# Patient Record
Sex: FEMALE | Race: WHITE | NOT HISPANIC OR LATINO | Employment: FULL TIME | URBAN - METROPOLITAN AREA
[De-identification: names, ages, dates, MRNs, and addresses within clinical notes are randomized per-mention and may not be internally consistent; named-entity substitution may affect disease eponyms.]

---

## 2021-08-24 ENCOUNTER — HOSPITAL ENCOUNTER (EMERGENCY)
Facility: HOSPITAL | Age: 46
Discharge: HOME | End: 2021-08-24
Attending: EMERGENCY MEDICINE
Payer: COMMERCIAL

## 2021-08-24 VITALS
TEMPERATURE: 98 F | OXYGEN SATURATION: 99 % | RESPIRATION RATE: 18 BRPM | HEART RATE: 72 BPM | SYSTOLIC BLOOD PRESSURE: 136 MMHG | HEIGHT: 63 IN | WEIGHT: 170 LBS | DIASTOLIC BLOOD PRESSURE: 89 MMHG | BODY MASS INDEX: 30.12 KG/M2

## 2021-08-24 DIAGNOSIS — R52 PAIN: ICD-10-CM

## 2021-08-24 DIAGNOSIS — S63.502A SPRAIN OF LEFT WRIST, INITIAL ENCOUNTER: Primary | ICD-10-CM

## 2021-08-24 PROCEDURE — 25000003 PHARM REV CODE 250: Performed by: EMERGENCY MEDICINE

## 2021-08-24 PROCEDURE — 99284 EMERGENCY DEPT VISIT MOD MDM: CPT

## 2021-08-24 RX ORDER — HYDROCODONE BITARTRATE AND ACETAMINOPHEN 10; 325 MG/1; MG/1
1 TABLET ORAL
Status: COMPLETED | OUTPATIENT
Start: 2021-08-24 | End: 2021-08-24

## 2021-08-24 RX ADMIN — HYDROCODONE BITARTRATE AND ACETAMINOPHEN 1 TABLET: 10; 325 TABLET ORAL at 09:08

## 2021-10-06 ENCOUNTER — HOSPITAL ENCOUNTER (OUTPATIENT)
Facility: HOSPITAL | Age: 46
Discharge: HOME | End: 2021-10-07
Attending: EMERGENCY MEDICINE | Admitting: FAMILY MEDICINE
Payer: COMMERCIAL

## 2021-10-06 ENCOUNTER — CLINICAL SUPPORT (OUTPATIENT)
Dept: CARDIOLOGY | Facility: HOSPITAL | Age: 46
End: 2021-10-06
Payer: COMMERCIAL

## 2021-10-06 VITALS — HEIGHT: 63 IN | WEIGHT: 189 LBS | BODY MASS INDEX: 33.49 KG/M2

## 2021-10-06 DIAGNOSIS — R53.1 LEFT-SIDED WEAKNESS: Primary | ICD-10-CM

## 2021-10-06 DIAGNOSIS — I63.9 STROKE: ICD-10-CM

## 2021-10-06 DIAGNOSIS — I63.9 CVA (CEREBRAL VASCULAR ACCIDENT): ICD-10-CM

## 2021-10-06 PROBLEM — E66.811 CLASS 1 OBESITY IN ADULT: Status: ACTIVE | Noted: 2021-10-06

## 2021-10-06 PROBLEM — R51.9 HEADACHE: Status: ACTIVE | Noted: 2021-10-06

## 2021-10-06 PROBLEM — I63.511 ACUTE ISCHEMIC RIGHT MCA STROKE: Status: ACTIVE | Noted: 2021-10-06

## 2021-10-06 PROBLEM — E66.9 CLASS 1 OBESITY IN ADULT: Status: ACTIVE | Noted: 2021-10-06

## 2021-10-06 LAB
ALBUMIN SERPL BCP-MCNC: 5.1 G/DL (ref 3.5–5.2)
ALP SERPL-CCNC: 70 U/L (ref 55–135)
ALT SERPL W/O P-5'-P-CCNC: 32 U/L (ref 10–44)
ANION GAP SERPL CALC-SCNC: 11 MMOL/L (ref 8–16)
APTT PPP: 28.2 SEC (ref 25.6–35.8)
AST SERPL-CCNC: 25 U/L (ref 10–40)
B-HCG UR QL: NEGATIVE
BACTERIA #/AREA URNS HPF: ABNORMAL /HPF
BASOPHILS # BLD AUTO: 0.05 K/UL (ref 0–0.2)
BASOPHILS NFR BLD: 0.8 % (ref 0–1.9)
BILIRUB SERPL-MCNC: 1.3 MG/DL (ref 0.1–1)
BILIRUB UR QL STRIP: NEGATIVE
BNP SERPL-MCNC: 82 PG/ML (ref 0–99)
BUN SERPL-MCNC: 13 MG/DL (ref 6–20)
CALCIUM SERPL-MCNC: 9.7 MG/DL (ref 8.7–10.5)
CHLORIDE SERPL-SCNC: 100 MMOL/L (ref 95–110)
CHOLEST SERPL-MCNC: 157 MG/DL (ref 120–199)
CHOLEST/HDLC SERPL: 3 {RATIO} (ref 2–5)
CLARITY UR: CLEAR
CO2 SERPL-SCNC: 25 MMOL/L (ref 23–29)
COLOR UR: YELLOW
CREAT SERPL-MCNC: 0.8 MG/DL (ref 0.5–1.4)
CREAT SERPL-MCNC: 0.8 MG/DL (ref 0.5–1.4)
CTP QC/QA: YES
DIFFERENTIAL METHOD: ABNORMAL
EOSINOPHIL # BLD AUTO: 0.1 K/UL (ref 0–0.5)
EOSINOPHIL NFR BLD: 1.9 % (ref 0–8)
ERYTHROCYTE [DISTWIDTH] IN BLOOD BY AUTOMATED COUNT: 12.8 % (ref 11.5–14.5)
EST. GFR  (AFRICAN AMERICAN): >60 ML/MIN/1.73 M^2
EST. GFR  (NON AFRICAN AMERICAN): >60 ML/MIN/1.73 M^2
ESTIMATED AVG GLUCOSE: 97 MG/DL (ref 68–131)
GLUCOSE SERPL-MCNC: 90 MG/DL (ref 70–110)
GLUCOSE SERPL-MCNC: 91 MG/DL (ref 70–110)
GLUCOSE UR QL STRIP: NEGATIVE
HBA1C MFR BLD: 5 % (ref 4.5–6.2)
HCT VFR BLD AUTO: 46.1 % (ref 37–48.5)
HDLC SERPL-MCNC: 53 MG/DL (ref 40–75)
HDLC SERPL: 33.8 % (ref 20–50)
HGB BLD-MCNC: 16.4 G/DL (ref 12–16)
HGB UR QL STRIP: NEGATIVE
HYALINE CASTS #/AREA URNS LPF: 8 /LPF
IMM GRANULOCYTES # BLD AUTO: 0.01 K/UL (ref 0–0.04)
IMM GRANULOCYTES NFR BLD AUTO: 0.2 % (ref 0–0.5)
INR PPP: 1.1
KETONES UR QL STRIP: NEGATIVE
LDLC SERPL CALC-MCNC: 94 MG/DL (ref 63–159)
LEUKOCYTE ESTERASE UR QL STRIP: ABNORMAL
LYMPHOCYTES # BLD AUTO: 2.2 K/UL (ref 1–4.8)
LYMPHOCYTES NFR BLD: 34.3 % (ref 18–48)
MCH RBC QN AUTO: 31.8 PG (ref 27–31)
MCHC RBC AUTO-ENTMCNC: 35.6 G/DL (ref 32–36)
MCV RBC AUTO: 89 FL (ref 82–98)
MICROSCOPIC COMMENT: ABNORMAL
MONOCYTES # BLD AUTO: 0.4 K/UL (ref 0.3–1)
MONOCYTES NFR BLD: 6.9 % (ref 4–15)
NEUTROPHILS # BLD AUTO: 3.5 K/UL (ref 1.8–7.7)
NEUTROPHILS NFR BLD: 55.9 % (ref 38–73)
NITRITE UR QL STRIP: NEGATIVE
NONHDLC SERPL-MCNC: 104 MG/DL
NRBC BLD-RTO: 0 /100 WBC
PH UR STRIP: 7 [PH] (ref 5–8)
PLATELET # BLD AUTO: 288 K/UL (ref 150–450)
PMV BLD AUTO: 9.8 FL (ref 9.2–12.9)
POTASSIUM SERPL-SCNC: 3.3 MMOL/L (ref 3.5–5.1)
PROT SERPL-MCNC: 8.1 G/DL (ref 6–8.4)
PROT UR QL STRIP: ABNORMAL
PROTHROMBIN TIME: 13 SEC (ref 11.8–14.3)
RBC # BLD AUTO: 5.16 M/UL (ref 4–5.4)
RBC #/AREA URNS HPF: 2 /HPF (ref 0–4)
SAMPLE: NORMAL
SARS-COV-2 RDRP RESP QL NAA+PROBE: NEGATIVE
SODIUM SERPL-SCNC: 136 MMOL/L (ref 136–145)
SP GR UR STRIP: >1.03 (ref 1–1.03)
SQUAMOUS #/AREA URNS HPF: 2 /HPF
TRIGL SERPL-MCNC: 50 MG/DL (ref 30–150)
TROPONIN I SERPL DL<=0.01 NG/ML-MCNC: <0.03 NG/ML
TSH SERPL DL<=0.005 MIU/L-ACNC: 1.32 UIU/ML (ref 0.34–5.6)
URN SPEC COLLECT METH UR: ABNORMAL
UROBILINOGEN UR STRIP-ACNC: NEGATIVE EU/DL
WBC # BLD AUTO: 6.26 K/UL (ref 3.9–12.7)
WBC #/AREA URNS HPF: 31 /HPF (ref 0–5)

## 2021-10-06 PROCEDURE — 93005 ELECTROCARDIOGRAM TRACING: CPT | Performed by: INTERNAL MEDICINE

## 2021-10-06 PROCEDURE — G0378 HOSPITAL OBSERVATION PER HR: HCPCS

## 2021-10-06 PROCEDURE — 93306 TTE W/DOPPLER COMPLETE: CPT

## 2021-10-06 PROCEDURE — 81001 URINALYSIS AUTO W/SCOPE: CPT | Performed by: NURSE PRACTITIONER

## 2021-10-06 PROCEDURE — 82962 GLUCOSE BLOOD TEST: CPT

## 2021-10-06 PROCEDURE — 85730 THROMBOPLASTIN TIME PARTIAL: CPT | Performed by: EMERGENCY MEDICINE

## 2021-10-06 PROCEDURE — 83036 HEMOGLOBIN GLYCOSYLATED A1C: CPT | Performed by: EMERGENCY MEDICINE

## 2021-10-06 PROCEDURE — 84484 ASSAY OF TROPONIN QUANT: CPT | Performed by: EMERGENCY MEDICINE

## 2021-10-06 PROCEDURE — 85610 PROTHROMBIN TIME: CPT | Performed by: EMERGENCY MEDICINE

## 2021-10-06 PROCEDURE — 36415 COLL VENOUS BLD VENIPUNCTURE: CPT | Performed by: EMERGENCY MEDICINE

## 2021-10-06 PROCEDURE — 96374 THER/PROPH/DIAG INJ IV PUSH: CPT

## 2021-10-06 PROCEDURE — 87086 URINE CULTURE/COLONY COUNT: CPT | Performed by: NURSE PRACTITIONER

## 2021-10-06 PROCEDURE — 93306 ECHO (CUPID ONLY): ICD-10-PCS | Mod: 26,,, | Performed by: INTERNAL MEDICINE

## 2021-10-06 PROCEDURE — 25500020 PHARM REV CODE 255: Performed by: EMERGENCY MEDICINE

## 2021-10-06 PROCEDURE — 83880 ASSAY OF NATRIURETIC PEPTIDE: CPT | Performed by: EMERGENCY MEDICINE

## 2021-10-06 PROCEDURE — G0425 INPT/ED TELECONSULT30: HCPCS | Mod: GT,,, | Performed by: STUDENT IN AN ORGANIZED HEALTH CARE EDUCATION/TRAINING PROGRAM

## 2021-10-06 PROCEDURE — 93010 EKG 12-LEAD: ICD-10-PCS | Mod: ,,, | Performed by: INTERNAL MEDICINE

## 2021-10-06 PROCEDURE — 25000003 PHARM REV CODE 250: Performed by: EMERGENCY MEDICINE

## 2021-10-06 PROCEDURE — 94799 UNLISTED PULMONARY SVC/PX: CPT

## 2021-10-06 PROCEDURE — 80053 COMPREHEN METABOLIC PANEL: CPT | Performed by: EMERGENCY MEDICINE

## 2021-10-06 PROCEDURE — 93306 TTE W/DOPPLER COMPLETE: CPT | Mod: 26,,, | Performed by: INTERNAL MEDICINE

## 2021-10-06 PROCEDURE — 99900035 HC TECH TIME PER 15 MIN (STAT)

## 2021-10-06 PROCEDURE — 94761 N-INVAS EAR/PLS OXIMETRY MLT: CPT

## 2021-10-06 PROCEDURE — 84443 ASSAY THYROID STIM HORMONE: CPT | Performed by: EMERGENCY MEDICINE

## 2021-10-06 PROCEDURE — 93010 ELECTROCARDIOGRAM REPORT: CPT | Mod: ,,, | Performed by: INTERNAL MEDICINE

## 2021-10-06 PROCEDURE — U0002 COVID-19 LAB TEST NON-CDC: HCPCS | Performed by: EMERGENCY MEDICINE

## 2021-10-06 PROCEDURE — 25000003 PHARM REV CODE 250: Performed by: NURSE PRACTITIONER

## 2021-10-06 PROCEDURE — 99285 EMERGENCY DEPT VISIT HI MDM: CPT | Mod: 25

## 2021-10-06 PROCEDURE — 81025 URINE PREGNANCY TEST: CPT | Performed by: EMERGENCY MEDICINE

## 2021-10-06 PROCEDURE — 85025 COMPLETE CBC W/AUTO DIFF WBC: CPT | Performed by: EMERGENCY MEDICINE

## 2021-10-06 PROCEDURE — 80061 LIPID PANEL: CPT | Performed by: EMERGENCY MEDICINE

## 2021-10-06 PROCEDURE — 63600175 PHARM REV CODE 636 W HCPCS: Performed by: NURSE PRACTITIONER

## 2021-10-06 PROCEDURE — G0425 PR INPT TELEHEALTH CONSULT 30M: ICD-10-PCS | Mod: GT,,, | Performed by: STUDENT IN AN ORGANIZED HEALTH CARE EDUCATION/TRAINING PROGRAM

## 2021-10-06 RX ORDER — ASPIRIN 325 MG
325 TABLET, DELAYED RELEASE (ENTERIC COATED) ORAL DAILY
Status: DISCONTINUED | OUTPATIENT
Start: 2021-10-07 | End: 2021-10-07 | Stop reason: HOSPADM

## 2021-10-06 RX ORDER — AMLODIPINE BESYLATE 2.5 MG/1
2.5 TABLET ORAL DAILY
Status: DISCONTINUED | OUTPATIENT
Start: 2021-10-07 | End: 2021-10-07 | Stop reason: HOSPADM

## 2021-10-06 RX ORDER — ASPIRIN 325 MG
325 TABLET ORAL ONCE
Status: COMPLETED | OUTPATIENT
Start: 2021-10-06 | End: 2021-10-06

## 2021-10-06 RX ORDER — MAGNESIUM SULFATE HEPTAHYDRATE 40 MG/ML
2 INJECTION, SOLUTION INTRAVENOUS
Status: DISCONTINUED | OUTPATIENT
Start: 2021-10-06 | End: 2021-10-07 | Stop reason: HOSPADM

## 2021-10-06 RX ORDER — POTASSIUM CHLORIDE 20 MEQ/1
20 TABLET, EXTENDED RELEASE ORAL
Status: DISCONTINUED | OUTPATIENT
Start: 2021-10-06 | End: 2021-10-07 | Stop reason: HOSPADM

## 2021-10-06 RX ORDER — POTASSIUM CHLORIDE 7.45 MG/ML
20 INJECTION INTRAVENOUS
Status: DISCONTINUED | OUTPATIENT
Start: 2021-10-06 | End: 2021-10-07 | Stop reason: HOSPADM

## 2021-10-06 RX ORDER — POTASSIUM CHLORIDE 20 MEQ/1
40 TABLET, EXTENDED RELEASE ORAL
Status: DISCONTINUED | OUTPATIENT
Start: 2021-10-06 | End: 2021-10-07 | Stop reason: HOSPADM

## 2021-10-06 RX ORDER — LANOLIN ALCOHOL/MO/W.PET/CERES
800 CREAM (GRAM) TOPICAL
Status: DISCONTINUED | OUTPATIENT
Start: 2021-10-06 | End: 2021-10-07 | Stop reason: HOSPADM

## 2021-10-06 RX ORDER — POTASSIUM CHLORIDE 7.45 MG/ML
40 INJECTION INTRAVENOUS
Status: DISCONTINUED | OUTPATIENT
Start: 2021-10-06 | End: 2021-10-07 | Stop reason: HOSPADM

## 2021-10-06 RX ORDER — HYDROCHLOROTHIAZIDE 25 MG/1
25 TABLET ORAL DAILY
Status: DISCONTINUED | OUTPATIENT
Start: 2021-10-07 | End: 2021-10-07 | Stop reason: HOSPADM

## 2021-10-06 RX ORDER — LORAZEPAM 2 MG/ML
0.5 INJECTION INTRAMUSCULAR
Status: DISCONTINUED | OUTPATIENT
Start: 2021-10-06 | End: 2021-10-07 | Stop reason: HOSPADM

## 2021-10-06 RX ORDER — LABETALOL HYDROCHLORIDE 5 MG/ML
10 INJECTION, SOLUTION INTRAVENOUS
Status: DISCONTINUED | OUTPATIENT
Start: 2021-10-06 | End: 2021-10-07 | Stop reason: HOSPADM

## 2021-10-06 RX ORDER — MAGNESIUM SULFATE HEPTAHYDRATE 40 MG/ML
4 INJECTION, SOLUTION INTRAVENOUS
Status: DISCONTINUED | OUTPATIENT
Start: 2021-10-06 | End: 2021-10-07 | Stop reason: HOSPADM

## 2021-10-06 RX ORDER — ATORVASTATIN CALCIUM 40 MG/1
40 TABLET, FILM COATED ORAL DAILY
Status: DISCONTINUED | OUTPATIENT
Start: 2021-10-06 | End: 2021-10-07 | Stop reason: HOSPADM

## 2021-10-06 RX ORDER — AMLODIPINE BESYLATE 2.5 MG/1
2.5 TABLET ORAL DAILY
COMMUNITY
Start: 2021-09-08 | End: 2021-10-19 | Stop reason: SDUPTHER

## 2021-10-06 RX ORDER — POLYETHYLENE GLYCOL 3350 17 G/17G
17 POWDER, FOR SOLUTION ORAL 2 TIMES DAILY PRN
Status: DISCONTINUED | OUTPATIENT
Start: 2021-10-06 | End: 2021-10-07 | Stop reason: HOSPADM

## 2021-10-06 RX ORDER — MAGNESIUM SULFATE 1 G/100ML
1 INJECTION INTRAVENOUS
Status: DISCONTINUED | OUTPATIENT
Start: 2021-10-06 | End: 2021-10-07 | Stop reason: HOSPADM

## 2021-10-06 RX ORDER — ACETAMINOPHEN 325 MG/1
325 TABLET ORAL EVERY 6 HOURS PRN
COMMUNITY
End: 2024-02-06

## 2021-10-06 RX ORDER — HYDROCHLOROTHIAZIDE 25 MG/1
25 TABLET ORAL DAILY
COMMUNITY
Start: 2021-08-24 | End: 2021-10-19 | Stop reason: SDUPTHER

## 2021-10-06 RX ORDER — SODIUM CHLORIDE 0.9 % (FLUSH) 0.9 %
10 SYRINGE (ML) INJECTION
Status: DISCONTINUED | OUTPATIENT
Start: 2021-10-06 | End: 2021-10-07 | Stop reason: HOSPADM

## 2021-10-06 RX ORDER — AMOXICILLIN 250 MG
1 CAPSULE ORAL 2 TIMES DAILY
Status: DISCONTINUED | OUTPATIENT
Start: 2021-10-06 | End: 2021-10-07 | Stop reason: HOSPADM

## 2021-10-06 RX ORDER — ONDANSETRON 2 MG/ML
4 INJECTION INTRAMUSCULAR; INTRAVENOUS EVERY 8 HOURS PRN
Status: DISCONTINUED | OUTPATIENT
Start: 2021-10-06 | End: 2021-10-07 | Stop reason: HOSPADM

## 2021-10-06 RX ADMIN — ASPIRIN 325 MG ORAL TABLET 325 MG: 325 PILL ORAL at 11:10

## 2021-10-06 RX ADMIN — IOHEXOL 100 ML: 350 INJECTION, SOLUTION INTRAVENOUS at 11:10

## 2021-10-06 RX ADMIN — POTASSIUM BICARBONATE 25 MEQ: 977.5 TABLET, EFFERVESCENT ORAL at 12:10

## 2021-10-06 RX ADMIN — ATORVASTATIN CALCIUM 40 MG: 20 TABLET, FILM COATED ORAL at 01:10

## 2021-10-06 RX ADMIN — LORAZEPAM 0.5 MG: 2 INJECTION INTRAMUSCULAR; INTRAVENOUS at 05:10

## 2021-10-07 VITALS
DIASTOLIC BLOOD PRESSURE: 66 MMHG | RESPIRATION RATE: 17 BRPM | HEART RATE: 75 BPM | SYSTOLIC BLOOD PRESSURE: 119 MMHG | HEIGHT: 62 IN | OXYGEN SATURATION: 96 % | TEMPERATURE: 98 F | BODY MASS INDEX: 34.77 KG/M2 | WEIGHT: 188.94 LBS

## 2021-10-07 PROBLEM — R51.9 HEADACHE: Status: RESOLVED | Noted: 2021-10-06 | Resolved: 2021-10-07

## 2021-10-07 PROBLEM — R53.1 LEFT-SIDED WEAKNESS: Status: RESOLVED | Noted: 2021-10-06 | Resolved: 2021-10-07

## 2021-10-07 PROBLEM — I63.511 ACUTE ISCHEMIC RIGHT MCA STROKE: Status: RESOLVED | Noted: 2021-10-06 | Resolved: 2021-10-07

## 2021-10-07 LAB
ALBUMIN SERPL BCP-MCNC: 4.1 G/DL (ref 3.5–5.2)
ALP SERPL-CCNC: 53 U/L (ref 55–135)
ALT SERPL W/O P-5'-P-CCNC: 30 U/L (ref 10–44)
ANION GAP SERPL CALC-SCNC: 9 MMOL/L (ref 8–16)
AORTIC ROOT ANNULUS: 2.36 CM
AORTIC VALVE CUSP SEPERATION: 1.71 CM
AST SERPL-CCNC: 18 U/L (ref 10–40)
AV INDEX (PROSTH): 1.06
AV MEAN GRADIENT: 4 MMHG
AV PEAK GRADIENT: 8 MMHG
AV VALVE AREA: 2.61 CM2
AV VELOCITY RATIO: 107.42
BASOPHILS # BLD AUTO: 0.04 K/UL (ref 0–0.2)
BASOPHILS NFR BLD: 0.9 % (ref 0–1.9)
BILIRUB SERPL-MCNC: 1.1 MG/DL (ref 0.1–1)
BSA FOR ECHO PROCEDURE: 1.95 M2
BUN SERPL-MCNC: 14 MG/DL (ref 6–20)
CALCIUM SERPL-MCNC: 9.1 MG/DL (ref 8.7–10.5)
CHLORIDE SERPL-SCNC: 104 MMOL/L (ref 95–110)
CO2 SERPL-SCNC: 27 MMOL/L (ref 23–29)
CREAT SERPL-MCNC: 0.8 MG/DL (ref 0.5–1.4)
CV ECHO LV RWT: 0.55 CM
DIFFERENTIAL METHOD: ABNORMAL
DOP CALC AO PEAK VEL: 1.42 M/S
DOP CALC AO VTI: 27.17 CM
DOP CALC LVOT AREA: 2.5 CM2
DOP CALC LVOT DIAMETER: 1.77 CM
DOP CALC LVOT PEAK VEL: 152.53 M/S
DOP CALC LVOT STROKE VOLUME: 70.98 CM3
DOP CALCLVOT PEAK VEL VTI: 28.86 CM
E WAVE DECELERATION TIME: 226.21 MSEC
E/A RATIO: 1.03
E/E' RATIO: 6.48 M/S
ECHO LV POSTERIOR WALL: 1.03 CM (ref 0.6–1.1)
EJECTION FRACTION: 75 %
EOSINOPHIL # BLD AUTO: 0.1 K/UL (ref 0–0.5)
EOSINOPHIL NFR BLD: 2.4 % (ref 0–8)
ERYTHROCYTE [DISTWIDTH] IN BLOOD BY AUTOMATED COUNT: 12.7 % (ref 11.5–14.5)
EST. GFR  (AFRICAN AMERICAN): >60 ML/MIN/1.73 M^2
EST. GFR  (NON AFRICAN AMERICAN): >60 ML/MIN/1.73 M^2
FRACTIONAL SHORTENING: 47 % (ref 28–44)
GLUCOSE SERPL-MCNC: 102 MG/DL (ref 70–110)
HCT VFR BLD AUTO: 40 % (ref 37–48.5)
HGB BLD-MCNC: 13.9 G/DL (ref 12–16)
IMM GRANULOCYTES # BLD AUTO: 0.01 K/UL (ref 0–0.04)
IMM GRANULOCYTES NFR BLD AUTO: 0.2 % (ref 0–0.5)
INTERVENTRICULAR SEPTUM: 0.98 CM (ref 0.6–1.1)
LEFT ATRIUM SIZE: 3.33 CM
LEFT INTERNAL DIMENSION IN SYSTOLE: 1.99 CM (ref 2.1–4)
LEFT VENTRICLE DIASTOLIC VOLUME INDEX: 40.21 ML/M2
LEFT VENTRICLE DIASTOLIC VOLUME: 76 ML
LEFT VENTRICLE MASS INDEX: 61 G/M2
LEFT VENTRICLE SYSTOLIC VOLUME INDEX: 8 ML/M2
LEFT VENTRICLE SYSTOLIC VOLUME: 15.2 ML
LEFT VENTRICULAR INTERNAL DIMENSION IN DIASTOLE: 3.73 CM (ref 3.5–6)
LEFT VENTRICULAR MASS: 114.77 G
LV LATERAL E/E' RATIO: 5.79 M/S
LV SEPTAL E/E' RATIO: 7.36 M/S
LYMPHOCYTES # BLD AUTO: 1.7 K/UL (ref 1–4.8)
LYMPHOCYTES NFR BLD: 36.4 % (ref 18–48)
MAGNESIUM SERPL-MCNC: 1.9 MG/DL (ref 1.6–2.6)
MCH RBC QN AUTO: 31.7 PG (ref 27–31)
MCHC RBC AUTO-ENTMCNC: 34.8 G/DL (ref 32–36)
MCV RBC AUTO: 91 FL (ref 82–98)
MONOCYTES # BLD AUTO: 0.4 K/UL (ref 0.3–1)
MONOCYTES NFR BLD: 7.8 % (ref 4–15)
MV PEAK A VEL: 0.79 M/S
MV PEAK E VEL: 0.81 M/S
NEUTROPHILS # BLD AUTO: 2.4 K/UL (ref 1.8–7.7)
NEUTROPHILS NFR BLD: 52.3 % (ref 38–73)
NRBC BLD-RTO: 0 /100 WBC
PHOSPHATE SERPL-MCNC: 4.1 MG/DL (ref 2.7–4.5)
PISA TR MAX VEL: 2.13 M/S
PLATELET # BLD AUTO: 251 K/UL (ref 150–450)
PMV BLD AUTO: 10.1 FL (ref 9.2–12.9)
POTASSIUM SERPL-SCNC: 3.5 MMOL/L (ref 3.5–5.1)
PROT SERPL-MCNC: 6.6 G/DL (ref 6–8.4)
PV PEAK VELOCITY: 140.56 CM/S
RA PRESSURE: 3 MMHG
RBC # BLD AUTO: 4.38 M/UL (ref 4–5.4)
RIGHT VENTRICULAR END-DIASTOLIC DIMENSION: 270 CM
SODIUM SERPL-SCNC: 140 MMOL/L (ref 136–145)
TDI LATERAL: 0.14 M/S
TDI SEPTAL: 0.11 M/S
TDI: 0.13 M/S
TR MAX PG: 18 MMHG
TV REST PULMONARY ARTERY PRESSURE: 21 MMHG
WBC # BLD AUTO: 4.61 K/UL (ref 3.9–12.7)

## 2021-10-07 PROCEDURE — 97165 OT EVAL LOW COMPLEX 30 MIN: CPT

## 2021-10-07 PROCEDURE — 63600175 PHARM REV CODE 636 W HCPCS: Performed by: INTERNAL MEDICINE

## 2021-10-07 PROCEDURE — 85025 COMPLETE CBC W/AUTO DIFF WBC: CPT | Performed by: NURSE PRACTITIONER

## 2021-10-07 PROCEDURE — 36415 COLL VENOUS BLD VENIPUNCTURE: CPT | Performed by: NURSE PRACTITIONER

## 2021-10-07 PROCEDURE — 80053 COMPREHEN METABOLIC PANEL: CPT | Performed by: NURSE PRACTITIONER

## 2021-10-07 PROCEDURE — G0378 HOSPITAL OBSERVATION PER HR: HCPCS

## 2021-10-07 PROCEDURE — 90471 IMMUNIZATION ADMIN: CPT | Performed by: INTERNAL MEDICINE

## 2021-10-07 PROCEDURE — 83735 ASSAY OF MAGNESIUM: CPT | Performed by: NURSE PRACTITIONER

## 2021-10-07 PROCEDURE — 97161 PT EVAL LOW COMPLEX 20 MIN: CPT

## 2021-10-07 PROCEDURE — 84100 ASSAY OF PHOSPHORUS: CPT | Performed by: NURSE PRACTITIONER

## 2021-10-07 PROCEDURE — 25000003 PHARM REV CODE 250: Performed by: NURSE PRACTITIONER

## 2021-10-07 PROCEDURE — 90686 IIV4 VACC NO PRSV 0.5 ML IM: CPT | Performed by: INTERNAL MEDICINE

## 2021-10-07 RX ADMIN — AMLODIPINE BESYLATE 2.5 MG: 2.5 TABLET ORAL at 08:10

## 2021-10-07 RX ADMIN — POTASSIUM CHLORIDE 40 MEQ: 20 TABLET, EXTENDED RELEASE ORAL at 06:10

## 2021-10-07 RX ADMIN — HYDROCHLOROTHIAZIDE 25 MG: 25 TABLET ORAL at 08:10

## 2021-10-07 RX ADMIN — ATORVASTATIN CALCIUM 40 MG: 20 TABLET, FILM COATED ORAL at 08:10

## 2021-10-07 RX ADMIN — ASPIRIN 325 MG: 325 TABLET, COATED ORAL at 08:10

## 2021-10-07 RX ADMIN — INFLUENZA VIRUS VACCINE 0.5 ML: 15; 15; 15; 15 SUSPENSION INTRAMUSCULAR at 12:10

## 2021-10-08 LAB — BACTERIA UR CULT: ABNORMAL

## 2021-10-19 ENCOUNTER — OFFICE VISIT (OUTPATIENT)
Dept: FAMILY MEDICINE | Facility: CLINIC | Age: 46
End: 2021-10-19
Payer: COMMERCIAL

## 2021-10-19 VITALS
OXYGEN SATURATION: 99 % | DIASTOLIC BLOOD PRESSURE: 86 MMHG | TEMPERATURE: 99 F | RESPIRATION RATE: 18 BRPM | WEIGHT: 189.13 LBS | SYSTOLIC BLOOD PRESSURE: 136 MMHG | HEIGHT: 62 IN | HEART RATE: 84 BPM | BODY MASS INDEX: 34.8 KG/M2

## 2021-10-19 DIAGNOSIS — E78.5 DYSLIPIDEMIA: ICD-10-CM

## 2021-10-19 DIAGNOSIS — G45.9 TIA (TRANSIENT ISCHEMIC ATTACK): ICD-10-CM

## 2021-10-19 DIAGNOSIS — Z12.4 PAP SMEAR FOR CERVICAL CANCER SCREENING: ICD-10-CM

## 2021-10-19 DIAGNOSIS — Z82.49 FAMILY HISTORY OF ATRIAL FIBRILLATION: ICD-10-CM

## 2021-10-19 DIAGNOSIS — I10 ESSENTIAL HYPERTENSION: ICD-10-CM

## 2021-10-19 DIAGNOSIS — Z12.31 SCREENING MAMMOGRAM FOR BREAST CANCER: ICD-10-CM

## 2021-10-19 DIAGNOSIS — Z11.59 ENCOUNTER FOR HEPATITIS C SCREENING TEST FOR LOW RISK PATIENT: ICD-10-CM

## 2021-10-19 DIAGNOSIS — Z11.4 SCREENING FOR HIV (HUMAN IMMUNODEFICIENCY VIRUS): ICD-10-CM

## 2021-10-19 DIAGNOSIS — Z09 HOSPITAL DISCHARGE FOLLOW-UP: Primary | ICD-10-CM

## 2021-10-19 PROCEDURE — 99204 OFFICE O/P NEW MOD 45 MIN: CPT | Mod: S$GLB,,, | Performed by: NURSE PRACTITIONER

## 2021-10-19 PROCEDURE — 99204 PR OFFICE/OUTPT VISIT, NEW, LEVL IV, 45-59 MIN: ICD-10-PCS | Mod: S$GLB,,, | Performed by: NURSE PRACTITIONER

## 2021-10-19 RX ORDER — ATORVASTATIN CALCIUM 40 MG/1
40 TABLET, FILM COATED ORAL NIGHTLY
Qty: 30 TABLET | Refills: 3 | Status: SHIPPED | OUTPATIENT
Start: 2021-10-19 | End: 2021-10-22

## 2021-10-19 RX ORDER — AMLODIPINE BESYLATE 2.5 MG/1
2.5 TABLET ORAL DAILY
Qty: 30 TABLET | Refills: 3 | Status: SHIPPED | OUTPATIENT
Start: 2021-10-19 | End: 2021-11-16 | Stop reason: SDUPTHER

## 2021-10-19 RX ORDER — ASPIRIN 325 MG
325 TABLET ORAL DAILY
Qty: 90 TABLET | Refills: 1 | Status: SHIPPED | OUTPATIENT
Start: 2021-10-19 | End: 2021-11-16

## 2021-10-19 RX ORDER — HYDROCHLOROTHIAZIDE 25 MG/1
25 TABLET ORAL DAILY
Qty: 30 TABLET | Refills: 3 | Status: SHIPPED | OUTPATIENT
Start: 2021-10-19 | End: 2021-11-16 | Stop reason: SDUPTHER

## 2021-10-20 ENCOUNTER — TELEPHONE (OUTPATIENT)
Dept: FAMILY MEDICINE | Facility: CLINIC | Age: 46
End: 2021-10-20

## 2021-10-22 ENCOUNTER — TELEPHONE (OUTPATIENT)
Dept: FAMILY MEDICINE | Facility: CLINIC | Age: 46
End: 2021-10-22
Payer: COMMERCIAL

## 2021-10-22 DIAGNOSIS — E78.5 DYSLIPIDEMIA: Primary | ICD-10-CM

## 2021-10-22 RX ORDER — PRAVASTATIN SODIUM 20 MG/1
20 TABLET ORAL NIGHTLY
Qty: 30 TABLET | Refills: 3 | Status: SHIPPED | OUTPATIENT
Start: 2021-10-22 | End: 2021-11-16 | Stop reason: SDUPTHER

## 2021-11-02 ENCOUNTER — TELEPHONE (OUTPATIENT)
Dept: FAMILY MEDICINE | Facility: CLINIC | Age: 46
End: 2021-11-02
Payer: COMMERCIAL

## 2021-11-02 ENCOUNTER — HOSPITAL ENCOUNTER (OUTPATIENT)
Dept: RADIOLOGY | Facility: HOSPITAL | Age: 46
Discharge: HOME | End: 2021-11-02
Attending: NURSE PRACTITIONER
Payer: COMMERCIAL

## 2021-11-02 DIAGNOSIS — E78.5 DYSLIPIDEMIA: ICD-10-CM

## 2021-11-02 DIAGNOSIS — Z12.31 SCREENING MAMMOGRAM FOR BREAST CANCER: ICD-10-CM

## 2021-11-02 DIAGNOSIS — G45.9 TIA (TRANSIENT ISCHEMIC ATTACK): ICD-10-CM

## 2021-11-02 DIAGNOSIS — I10 ESSENTIAL HYPERTENSION: ICD-10-CM

## 2021-11-02 PROCEDURE — 77067 SCR MAMMO BI INCL CAD: CPT | Mod: TC,PO

## 2021-11-02 PROCEDURE — 93880 EXTRACRANIAL BILAT STUDY: CPT | Mod: TC,PO

## 2021-11-15 ENCOUNTER — TELEPHONE (OUTPATIENT)
Dept: FAMILY MEDICINE | Facility: CLINIC | Age: 46
End: 2021-11-15
Payer: COMMERCIAL

## 2021-11-16 ENCOUNTER — OFFICE VISIT (OUTPATIENT)
Dept: FAMILY MEDICINE | Facility: CLINIC | Age: 46
End: 2021-11-16
Payer: COMMERCIAL

## 2021-11-16 VITALS
OXYGEN SATURATION: 97 % | HEART RATE: 89 BPM | TEMPERATURE: 98 F | HEIGHT: 62 IN | SYSTOLIC BLOOD PRESSURE: 134 MMHG | WEIGHT: 194.69 LBS | RESPIRATION RATE: 18 BRPM | BODY MASS INDEX: 35.83 KG/M2 | DIASTOLIC BLOOD PRESSURE: 78 MMHG

## 2021-11-16 DIAGNOSIS — J30.9 ALLERGIC RHINITIS, UNSPECIFIED SEASONALITY, UNSPECIFIED TRIGGER: ICD-10-CM

## 2021-11-16 DIAGNOSIS — G43.901 MIGRAINE WITH STATUS MIGRAINOSUS, NOT INTRACTABLE, UNSPECIFIED MIGRAINE TYPE: ICD-10-CM

## 2021-11-16 DIAGNOSIS — R53.83 FATIGUE, UNSPECIFIED TYPE: ICD-10-CM

## 2021-11-16 DIAGNOSIS — I10 ESSENTIAL HYPERTENSION: Primary | ICD-10-CM

## 2021-11-16 DIAGNOSIS — E78.5 DYSLIPIDEMIA: ICD-10-CM

## 2021-11-16 PROCEDURE — 99214 OFFICE O/P EST MOD 30 MIN: CPT | Mod: S$GLB,,, | Performed by: NURSE PRACTITIONER

## 2021-11-16 PROCEDURE — 99214 PR OFFICE/OUTPT VISIT, EST, LEVL IV, 30-39 MIN: ICD-10-PCS | Mod: S$GLB,,, | Performed by: NURSE PRACTITIONER

## 2021-11-16 RX ORDER — FLUTICASONE PROPIONATE 50 MCG
1 SPRAY, SUSPENSION (ML) NASAL DAILY
Qty: 13 G | Refills: 5 | Status: SHIPPED | OUTPATIENT
Start: 2021-11-16 | End: 2023-08-03

## 2021-11-16 RX ORDER — HYDROCHLOROTHIAZIDE 25 MG/1
25 TABLET ORAL DAILY
Qty: 90 TABLET | Refills: 1 | Status: SHIPPED | OUTPATIENT
Start: 2021-11-16 | End: 2022-09-01 | Stop reason: SDUPTHER

## 2021-11-16 RX ORDER — CETIRIZINE HYDROCHLORIDE 10 MG/1
10 TABLET ORAL DAILY
COMMUNITY
End: 2021-11-16 | Stop reason: SDUPTHER

## 2021-11-16 RX ORDER — CETIRIZINE HYDROCHLORIDE 10 MG/1
10 TABLET ORAL DAILY
Qty: 90 TABLET | Refills: 1 | Status: SHIPPED | OUTPATIENT
Start: 2021-11-16 | End: 2023-08-03

## 2021-11-16 RX ORDER — PRAVASTATIN SODIUM 20 MG/1
20 TABLET ORAL NIGHTLY
Qty: 90 TABLET | Refills: 1 | Status: SHIPPED | OUTPATIENT
Start: 2021-11-16 | End: 2022-08-22 | Stop reason: SDUPTHER

## 2021-11-16 RX ORDER — AMLODIPINE BESYLATE 2.5 MG/1
2.5 TABLET ORAL DAILY
Qty: 90 TABLET | Refills: 1 | Status: SHIPPED | OUTPATIENT
Start: 2021-11-16 | End: 2022-09-01 | Stop reason: SDUPTHER

## 2021-11-16 RX ORDER — RIZATRIPTAN BENZOATE 10 MG/1
10 TABLET ORAL
Qty: 11 TABLET | Refills: 1 | Status: SHIPPED | OUTPATIENT
Start: 2021-11-16 | End: 2023-08-03

## 2021-11-16 RX ORDER — FLUTICASONE PROPIONATE 50 MCG
1 SPRAY, SUSPENSION (ML) NASAL DAILY
COMMUNITY
End: 2021-11-16 | Stop reason: SDUPTHER

## 2021-12-17 ENCOUNTER — TELEPHONE (OUTPATIENT)
Dept: FAMILY MEDICINE | Facility: CLINIC | Age: 46
End: 2021-12-17
Payer: COMMERCIAL

## 2022-02-11 ENCOUNTER — LAB VISIT (OUTPATIENT)
Dept: LAB | Facility: HOSPITAL | Age: 47
End: 2022-02-11
Attending: FAMILY MEDICINE
Payer: COMMERCIAL

## 2022-02-11 DIAGNOSIS — I10 ESSENTIAL HYPERTENSION: ICD-10-CM

## 2022-02-11 DIAGNOSIS — E78.5 DYSLIPIDEMIA: ICD-10-CM

## 2022-02-11 DIAGNOSIS — R53.83 FATIGUE, UNSPECIFIED TYPE: ICD-10-CM

## 2022-02-11 LAB
ALBUMIN SERPL BCP-MCNC: 4.4 G/DL (ref 3.5–5.2)
ALP SERPL-CCNC: 67 U/L (ref 55–135)
ALT SERPL W/O P-5'-P-CCNC: 24 U/L (ref 10–44)
ANION GAP SERPL CALC-SCNC: 11 MMOL/L (ref 8–16)
AST SERPL-CCNC: 18 U/L (ref 10–40)
BASOPHILS # BLD AUTO: 0.04 K/UL (ref 0–0.2)
BASOPHILS NFR BLD: 0.6 % (ref 0–1.9)
BILIRUB SERPL-MCNC: 1.1 MG/DL (ref 0.1–1)
BUN SERPL-MCNC: 13 MG/DL (ref 6–20)
CALCIUM SERPL-MCNC: 8.9 MG/DL (ref 8.7–10.5)
CHLORIDE SERPL-SCNC: 102 MMOL/L (ref 95–110)
CHOLEST SERPL-MCNC: 100 MG/DL (ref 120–199)
CHOLEST/HDLC SERPL: 2.4 {RATIO} (ref 2–5)
CO2 SERPL-SCNC: 25 MMOL/L (ref 23–29)
CREAT SERPL-MCNC: 0.8 MG/DL (ref 0.5–1.4)
DIFFERENTIAL METHOD: ABNORMAL
EOSINOPHIL # BLD AUTO: 0.1 K/UL (ref 0–0.5)
EOSINOPHIL NFR BLD: 2.1 % (ref 0–8)
ERYTHROCYTE [DISTWIDTH] IN BLOOD BY AUTOMATED COUNT: 13.2 % (ref 11.5–14.5)
EST. GFR  (AFRICAN AMERICAN): >60 ML/MIN/1.73 M^2
EST. GFR  (NON AFRICAN AMERICAN): >60 ML/MIN/1.73 M^2
GLUCOSE SERPL-MCNC: 97 MG/DL (ref 70–110)
HCT VFR BLD AUTO: 41.7 % (ref 37–48.5)
HDLC SERPL-MCNC: 42 MG/DL (ref 40–75)
HDLC SERPL: 42 % (ref 20–50)
HGB BLD-MCNC: 14.7 G/DL (ref 12–16)
IMM GRANULOCYTES # BLD AUTO: 0.01 K/UL (ref 0–0.04)
IMM GRANULOCYTES NFR BLD AUTO: 0.2 % (ref 0–0.5)
LDLC SERPL CALC-MCNC: 51.8 MG/DL (ref 63–159)
LYMPHOCYTES # BLD AUTO: 1.5 K/UL (ref 1–4.8)
LYMPHOCYTES NFR BLD: 24.1 % (ref 18–48)
MCH RBC QN AUTO: 31.6 PG (ref 27–31)
MCHC RBC AUTO-ENTMCNC: 35.3 G/DL (ref 32–36)
MCV RBC AUTO: 90 FL (ref 82–98)
MONOCYTES # BLD AUTO: 0.4 K/UL (ref 0.3–1)
MONOCYTES NFR BLD: 6.6 % (ref 4–15)
NEUTROPHILS # BLD AUTO: 4.1 K/UL (ref 1.8–7.7)
NEUTROPHILS NFR BLD: 66.4 % (ref 38–73)
NONHDLC SERPL-MCNC: 58 MG/DL
NRBC BLD-RTO: 0 /100 WBC
PLATELET # BLD AUTO: 268 K/UL (ref 150–450)
PMV BLD AUTO: 10.4 FL (ref 9.2–12.9)
POTASSIUM SERPL-SCNC: 3.5 MMOL/L (ref 3.5–5.1)
PROT SERPL-MCNC: 7.2 G/DL (ref 6–8.4)
RBC # BLD AUTO: 4.65 M/UL (ref 4–5.4)
SODIUM SERPL-SCNC: 138 MMOL/L (ref 136–145)
T4 FREE SERPL-MCNC: 0.89 NG/DL (ref 0.71–1.51)
TRIGL SERPL-MCNC: 31 MG/DL (ref 30–150)
TSH SERPL DL<=0.005 MIU/L-ACNC: 1 UIU/ML (ref 0.34–5.6)
WBC # BLD AUTO: 6.17 K/UL (ref 3.9–12.7)

## 2022-02-11 PROCEDURE — 36415 COLL VENOUS BLD VENIPUNCTURE: CPT | Performed by: NURSE PRACTITIONER

## 2022-02-11 PROCEDURE — 84439 ASSAY OF FREE THYROXINE: CPT | Performed by: NURSE PRACTITIONER

## 2022-02-11 PROCEDURE — 80053 COMPREHEN METABOLIC PANEL: CPT | Performed by: NURSE PRACTITIONER

## 2022-02-11 PROCEDURE — 80061 LIPID PANEL: CPT | Performed by: NURSE PRACTITIONER

## 2022-02-11 PROCEDURE — 84443 ASSAY THYROID STIM HORMONE: CPT | Performed by: NURSE PRACTITIONER

## 2022-02-11 PROCEDURE — 85025 COMPLETE CBC W/AUTO DIFF WBC: CPT | Performed by: NURSE PRACTITIONER

## 2022-02-17 ENCOUNTER — OFFICE VISIT (OUTPATIENT)
Dept: FAMILY MEDICINE | Facility: CLINIC | Age: 47
End: 2022-02-17
Payer: COMMERCIAL

## 2022-02-17 VITALS
OXYGEN SATURATION: 99 % | WEIGHT: 193.19 LBS | TEMPERATURE: 98 F | BODY MASS INDEX: 35.55 KG/M2 | HEIGHT: 62 IN | DIASTOLIC BLOOD PRESSURE: 84 MMHG | HEART RATE: 87 BPM | SYSTOLIC BLOOD PRESSURE: 136 MMHG | RESPIRATION RATE: 19 BRPM

## 2022-02-17 DIAGNOSIS — Z12.11 SCREENING FOR COLORECTAL CANCER: ICD-10-CM

## 2022-02-17 DIAGNOSIS — G43.901 MIGRAINE WITH STATUS MIGRAINOSUS, NOT INTRACTABLE, UNSPECIFIED MIGRAINE TYPE: ICD-10-CM

## 2022-02-17 DIAGNOSIS — Z80.0 FAMILY HISTORY OF COLON CANCER: ICD-10-CM

## 2022-02-17 DIAGNOSIS — I10 ESSENTIAL HYPERTENSION: Primary | ICD-10-CM

## 2022-02-17 DIAGNOSIS — E78.5 DYSLIPIDEMIA: ICD-10-CM

## 2022-02-17 DIAGNOSIS — Z12.12 SCREENING FOR COLORECTAL CANCER: ICD-10-CM

## 2022-02-17 PROBLEM — R53.83 FATIGUE: Status: RESOLVED | Noted: 2021-11-16 | Resolved: 2022-02-17

## 2022-02-17 PROBLEM — R53.1 LEFT-SIDED WEAKNESS: Status: RESOLVED | Noted: 2021-10-06 | Resolved: 2022-02-17

## 2022-02-17 PROBLEM — Z86.73 HISTORY OF TIA (TRANSIENT ISCHEMIC ATTACK): Status: ACTIVE | Noted: 2021-10-19

## 2022-02-17 PROCEDURE — 99214 PR OFFICE/OUTPT VISIT, EST, LEVL IV, 30-39 MIN: ICD-10-PCS | Mod: S$GLB,,, | Performed by: FAMILY MEDICINE

## 2022-02-17 PROCEDURE — 99214 OFFICE O/P EST MOD 30 MIN: CPT | Mod: S$GLB,,, | Performed by: FAMILY MEDICINE

## 2022-02-17 RX ORDER — ASPIRIN 81 MG/1
81 TABLET ORAL DAILY
COMMUNITY

## 2022-02-17 NOTE — PROGRESS NOTES
SUBJECTIVE:   HPI:  Hyperlipidemia and Hypertension    HPI  Sirisha Loco is a 46 y.o. F former patient of [facility] NP's who is transitioning care to this PCP. On chart review, she has a hx of suspected TIA w normal imaging, HTN, HLD, and Migraines. She is scheduled today for follow up on HTN, HLD.    *HTN - she is prescribed Amlodipine 2.5mg and HCTZ 25mg. Renal function wnl one week ago. Her BP at home have been running normal. Denies chest pain or pressure, shortness of breath, peripheral edema, orthopnea or paroxysmal nocturnal dyspnea. No vision changes, headaches, or unilateral weakness.    *HLD - on Pravastatin. LDL 51.8 one week ago.    *Migraines - well controlled, last was a couple of months ago. Used to happen around menses but did  not have one this month.    Health maintenance:  Due for colonoscopy; had one 3y ago, +polyps. +Strong family hx colon cancer in her sister and two grandparents. No fevers, no night sweats, no unintentional weight loss.    Review of patient's allergies indicates:   Allergen Reactions    Azithromycin     Ciprofloxacin     Latex, natural rubber        Current Outpatient Medications on File Prior to Visit   Medication Sig Dispense Refill    acetaminophen (TYLENOL) 325 MG tablet Take 325 mg by mouth every 6 (six) hours as needed for Pain.      amLODIPine (NORVASC) 2.5 MG tablet Take 1 tablet (2.5 mg total) by mouth once daily. 90 tablet 1    aspirin (ECOTRIN) 81 MG EC tablet Take 81 mg by mouth once daily.      cetirizine (ZYRTEC) 10 MG tablet Take 1 tablet (10 mg total) by mouth once daily. 90 tablet 1    fluticasone propionate (FLONASE) 50 mcg/actuation nasal spray 1 spray (50 mcg total) by Each Nostril route once daily. 13 g 5    hydroCHLOROthiazide (HYDRODIURIL) 25 MG tablet Take 1 tablet (25 mg total) by mouth once daily. 90 tablet 1    pravastatin (PRAVACHOL) 20 MG tablet Take 1 tablet (20 mg total) by mouth every evening. 90 tablet 1    rizatriptan  "(MAXALT) 10 MG tablet Take 1 tablet (10 mg total) by mouth as needed for Migraine (take one for HA, if not resolved in 2 hours may repeat dose, max dose 2 tabs in 24hours). 11 tablet 1     No current facility-administered medications on file prior to visit.       Past Medical History:   Diagnosis Date    Hypertension 2019    Stroke 10/07/2021     Past Surgical History:   Procedure Laterality Date    CHOLECYSTECTOMY      TUBAL LIGATION       Family History   Problem Relation Age of Onset    Atrial fibrillation Mother     Hypertension Mother     Arthritis Mother     Hypertension Father     Cancer Sister         colon    Cancer Maternal Grandfather         colon    Cancer Paternal Grandfather         colon    No Known Problems Brother     No Known Problems Son     Atrial fibrillation Maternal Grandmother     No Known Problems Brother     No Known Problems Son     No Known Problems Son        Review of Systems  As in HPI         OBJECTIVE:      Vitals:    02/17/22 1532   BP: 136/84   BP Location: Right arm   Patient Position: Sitting   BP Method: Medium (Manual)   Pulse: 87   Resp: 19   Temp: 97.9 °F (36.6 °C)   TempSrc: Oral   SpO2: 99%   Weight: 87.6 kg (193 lb 3.2 oz)   Height: 5' 2" (1.575 m)     Physical Exam  Vitals reviewed.   Constitutional:       General: She is not in acute distress.  Cardiovascular:      Rate and Rhythm: Normal rate and regular rhythm.      Pulses: Normal pulses.      Heart sounds: Normal heart sounds.   Pulmonary:      Effort: Pulmonary effort is normal.      Breath sounds: Normal breath sounds.   Musculoskeletal:      Right lower leg: No edema.      Left lower leg: No edema.   Skin:     General: Skin is warm and dry.   Neurological:      General: No focal deficit present.      Mental Status: She is alert and oriented to person, place, and time.   Psychiatric:         Mood and Affect: Mood normal.         Behavior: Behavior normal.         Thought Content: Thought content " normal.          Assessment:       ICD-10-CM ICD-9-CM    1. Essential hypertension  I10 401.9    2. Dyslipidemia  E78.5 272.4    3. Migraine with status migrainosus, not intractable, unspecified migraine type  G43.901 346.92    4. Screening for colorectal cancer  Z12.11 V76.51 Ambulatory referral/consult to Gastroenterology    Z12.12 V76.41    5. Family history of colon cancer  Z80.0 V16.0 Ambulatory referral/consult to Gastroenterology        Plan:   Essential hypertension - well controlled and asymptomatic. Continue current regimen.     Dyslipidemia - doing great on statin, asymptomatic. Normal transaminases. Continue current regimen.    Migraine with status migrainosus, not intractable, unspecified migraine type - currently well controlled. No changes necessary.    Screening for colorectal cancer - refer to Dr Wharton. Patient with strong family history and reports polypectomy 3y ago.  -     Ambulatory referral/consult to Gastroenterology; Future; Expected date: 02/24/2022    Family history of colon cancer  -     Ambulatory referral/consult to Gastroenterology; Future; Expected date: 02/24/2022        Follow up in about 3 months (around 5/17/2022) for HTN, HLD.      2/17/2022 Enid Hurt M.D.

## 2022-05-17 ENCOUNTER — TELEPHONE (OUTPATIENT)
Dept: FAMILY MEDICINE | Facility: CLINIC | Age: 47
End: 2022-05-17

## 2022-05-17 NOTE — TELEPHONE ENCOUNTER
Patient had called the  this morning wondering to see if she could switch Thursday's appointment to a virtual appointment. After speaking with Dr. Davis, she said that it would be okay only if patient was able to obtain blood pressure reading     Called patient and she said that she is able to obtain blood pressure readings and she was able to get established with Dr. Gaspar, at [facility] this past month for a colonoscopy.

## 2022-05-19 ENCOUNTER — OFFICE VISIT (OUTPATIENT)
Dept: FAMILY MEDICINE | Facility: CLINIC | Age: 47
End: 2022-05-19
Payer: COMMERCIAL

## 2022-05-19 VITALS
HEART RATE: 76 BPM | HEIGHT: 62 IN | BODY MASS INDEX: 35.51 KG/M2 | SYSTOLIC BLOOD PRESSURE: 160 MMHG | DIASTOLIC BLOOD PRESSURE: 97 MMHG | WEIGHT: 193 LBS

## 2022-05-19 DIAGNOSIS — I10 ESSENTIAL HYPERTENSION: Primary | ICD-10-CM

## 2022-05-19 DIAGNOSIS — G43.901 MIGRAINE WITH STATUS MIGRAINOSUS, NOT INTRACTABLE, UNSPECIFIED MIGRAINE TYPE: ICD-10-CM

## 2022-05-19 DIAGNOSIS — N39.3 STRESS INCONTINENCE: ICD-10-CM

## 2022-05-19 PROCEDURE — 99214 OFFICE O/P EST MOD 30 MIN: CPT | Mod: 95,,, | Performed by: FAMILY MEDICINE

## 2022-05-19 PROCEDURE — 99214 PR OFFICE/OUTPT VISIT, EST, LEVL IV, 30-39 MIN: ICD-10-PCS | Mod: 95,,, | Performed by: FAMILY MEDICINE

## 2022-05-19 RX ORDER — OXYBUTYNIN CHLORIDE 5 MG/1
5 TABLET, EXTENDED RELEASE ORAL DAILY
Qty: 30 TABLET | Refills: 1 | Status: SHIPPED | OUTPATIENT
Start: 2022-05-19 | End: 2022-12-19

## 2022-05-19 NOTE — PROGRESS NOTES
Subjective:        The chief complaint leading to consultation is: scheduled follow up  The patient location is:  Home  Visit type: Virtual visit with synchronous audio/video or audio only  This was a video visit in lieu of in-person visit due to the coronavirus emergency. Patient acknowledged and consented to the video visit encounter.     CIRILO Loco is a 46 y.o. F w hx of suspected TIA w normal imaging, HTN, HLD, and Migraines. She is being seen today for scheduled follow up.     *HTN - rx'd Amlodipine 2.5mg and HCTZ 25mg. Today's BP at home 157/110, with repeat 160/97. However, this is not her resting BP as she states she just ran to her office to use her BP cuff. Reports that generally her BP at home runs 110s-120s/70s. Denies chest pain or pressure, shortness of breath, peripheral edema, orthopnea or paroxysmal nocturnal dyspnea.      *Migraines - well controlled, has not had one in a few months now (used to get them around menses).    *The patient also c/o urinary incontinence. Happens when she coughs, sneezes, or laughs. Denies urgency. Has been happening for years, but feels it's been getting worse lately. Has never taken anything for it.     Health maintenance:   -Was referred to GI last OV as she was due for colonoscopy (previous was 3y ago, +polyps. +Strong family hx colon cancer in her sister and two grandparents). Today, reports she had it last week w Dr Gaspar, found no polyps, and needs to repeat in 5y (record not available to me today).   -She is due for pap. Will call the office to schedule appointment at her convenience.      Past Surgical History:   Procedure Laterality Date    CHOLECYSTECTOMY      TUBAL LIGATION       Past Medical History:   Diagnosis Date    Hypertension 2019    Stroke 10/07/2021     Family History   Problem Relation Age of Onset    Atrial fibrillation Mother     Hypertension Mother     Arthritis Mother     Hypertension Father     Cancer Sister          colon    Cancer Maternal Grandfather         colon    Cancer Paternal Grandfather         colon    No Known Problems Brother     No Known Problems Son     Atrial fibrillation Maternal Grandmother     No Known Problems Brother     No Known Problems Son     No Known Problems Son         Social History:   Marital Status:   Alcohol History:  reports current alcohol use.  Tobacco History:  reports that she has never smoked. She has never used smokeless tobacco.  Drug History:  reports no history of drug use.    Review of patient's allergies indicates:   Allergen Reactions    Azithromycin     Ciprofloxacin     Latex, natural rubber        Current Outpatient Medications   Medication Sig Dispense Refill    acetaminophen (TYLENOL) 325 MG tablet Take 325 mg by mouth every 6 (six) hours as needed for Pain.      amLODIPine (NORVASC) 2.5 MG tablet Take 1 tablet (2.5 mg total) by mouth once daily. 90 tablet 1    aspirin (ECOTRIN) 81 MG EC tablet Take 81 mg by mouth once daily.      cetirizine (ZYRTEC) 10 MG tablet Take 1 tablet (10 mg total) by mouth once daily. 90 tablet 1    fluticasone propionate (FLONASE) 50 mcg/actuation nasal spray 1 spray (50 mcg total) by Each Nostril route once daily. 13 g 5    hydroCHLOROthiazide (HYDRODIURIL) 25 MG tablet Take 1 tablet (25 mg total) by mouth once daily. 90 tablet 1    pravastatin (PRAVACHOL) 20 MG tablet Take 1 tablet (20 mg total) by mouth every evening. 90 tablet 1    oxybutynin (DITROPAN-XL) 5 MG TR24 Take 1 tablet (5 mg total) by mouth once daily. 30 tablet 1    rizatriptan (MAXALT) 10 MG tablet Take 1 tablet (10 mg total) by mouth as needed for Migraine (take one for HA, if not resolved in 2 hours may repeat dose, max dose 2 tabs in 24hours). 11 tablet 1     No current facility-administered medications for this visit.       Review of Systems   Constitutional: Negative for activity change and unexpected weight change.   HENT: Negative for hearing loss,  rhinorrhea and trouble swallowing.    Eyes: Negative for discharge and visual disturbance.   Respiratory: Negative for chest tightness and wheezing.    Cardiovascular: Negative for chest pain and palpitations.   Gastrointestinal: Negative for blood in stool, constipation, diarrhea and vomiting.   Endocrine: Negative for polydipsia and polyuria.   Genitourinary: Negative for difficulty urinating, dysuria, hematuria and menstrual problem.   Musculoskeletal: Negative for arthralgias, joint swelling and neck pain.   Neurological: Negative for weakness and headaches.   Psychiatric/Behavioral: Negative for confusion and dysphoric mood.         Objective:        Physical Exam:   Physical Exam   Limited - virtual visit  NAD  Pleasant and cooperative  A&Ox4  Normal breathing effort         Assessment:       1. Essential hypertension    2. Stress incontinence    3. Migraine with status migrainosus, not intractable, unspecified migraine type      Plan:     Essential hypertension - high today, but not a resting BP per patient report with generally normotensive log. Will measure 3-4x over the next week and send new BP over portal. Asymptomatic. Will continue current regimen for now.    Stress incontinence - trial of Oxybutynin; start low dose with room for uptitration.  -     oxybutynin (DITROPAN-XL) 5 MG TR24; Take 1 tablet (5 mg total) by mouth once daily.  Dispense: 30 tablet; Refill: 1    Migraine with status migrainosus, not intractable, unspecified migraine type - well controlled.    Health maintenance: will request records from GI; pt to call  to schedule pap.      No follow-ups on file.      Each patient to whom medical services are provided by telemedicine is:  (1) informed of the relationship between the physician and patient and the respective role of any other health care provider with respect to management of the patient; and (2) notified that he or she may decline to receive medical services by  telemedicine and may withdraw from such care at any time.

## 2022-06-29 ENCOUNTER — PATIENT MESSAGE (OUTPATIENT)
Dept: FAMILY MEDICINE | Facility: CLINIC | Age: 47
End: 2022-06-29

## 2022-07-01 VITALS — SYSTOLIC BLOOD PRESSURE: 119 MMHG | DIASTOLIC BLOOD PRESSURE: 75 MMHG

## 2022-07-05 ENCOUNTER — PATIENT MESSAGE (OUTPATIENT)
Dept: FAMILY MEDICINE | Facility: CLINIC | Age: 47
End: 2022-07-05

## 2022-08-22 DIAGNOSIS — E78.5 DYSLIPIDEMIA: ICD-10-CM

## 2022-08-22 RX ORDER — PRAVASTATIN SODIUM 20 MG/1
20 TABLET ORAL NIGHTLY
Qty: 90 TABLET | Refills: 3 | Status: SHIPPED | OUTPATIENT
Start: 2022-08-22 | End: 2023-08-03 | Stop reason: SDUPTHER

## 2022-09-01 DIAGNOSIS — I10 ESSENTIAL HYPERTENSION: ICD-10-CM

## 2022-09-02 RX ORDER — AMLODIPINE BESYLATE 2.5 MG/1
2.5 TABLET ORAL DAILY
Qty: 90 TABLET | Refills: 1 | Status: SHIPPED | OUTPATIENT
Start: 2022-09-02 | End: 2023-02-28

## 2022-09-02 RX ORDER — HYDROCHLOROTHIAZIDE 25 MG/1
25 TABLET ORAL DAILY
Qty: 90 TABLET | Refills: 1 | Status: SHIPPED | OUTPATIENT
Start: 2022-09-02 | End: 2023-06-06 | Stop reason: SDUPTHER

## 2022-09-11 ENCOUNTER — HOSPITAL ENCOUNTER (EMERGENCY)
Facility: HOSPITAL | Age: 47
Discharge: HOME | End: 2022-09-12
Attending: EMERGENCY MEDICINE
Payer: COMMERCIAL

## 2022-09-11 DIAGNOSIS — R31.9 URINARY TRACT INFECTION WITH HEMATURIA, SITE UNSPECIFIED: Primary | ICD-10-CM

## 2022-09-11 DIAGNOSIS — N39.0 URINARY TRACT INFECTION WITH HEMATURIA, SITE UNSPECIFIED: Primary | ICD-10-CM

## 2022-09-11 DIAGNOSIS — R10.9 ABDOMINAL PAIN: ICD-10-CM

## 2022-09-11 LAB
ALBUMIN SERPL BCP-MCNC: 4.7 G/DL (ref 3.5–5.2)
ALP SERPL-CCNC: 98 U/L (ref 55–135)
ALT SERPL W/O P-5'-P-CCNC: 34 U/L (ref 10–44)
ANION GAP SERPL CALC-SCNC: 12 MMOL/L (ref 8–16)
AST SERPL-CCNC: 24 U/L (ref 10–40)
B-HCG UR QL: NEGATIVE
BACTERIA #/AREA URNS HPF: ABNORMAL /HPF
BASOPHILS # BLD AUTO: 0.06 K/UL (ref 0–0.2)
BASOPHILS NFR BLD: 0.7 % (ref 0–1.9)
BILIRUB SERPL-MCNC: 1 MG/DL (ref 0.1–1)
BILIRUB UR QL STRIP: NEGATIVE
BUN SERPL-MCNC: 14 MG/DL (ref 6–20)
CALCIUM SERPL-MCNC: 9.5 MG/DL (ref 8.7–10.5)
CHLORIDE SERPL-SCNC: 101 MMOL/L (ref 95–110)
CLARITY UR: ABNORMAL
CO2 SERPL-SCNC: 24 MMOL/L (ref 23–29)
COLOR UR: YELLOW
CREAT SERPL-MCNC: 0.8 MG/DL (ref 0.5–1.4)
CTP QC/QA: YES
DIFFERENTIAL METHOD: ABNORMAL
EOSINOPHIL # BLD AUTO: 0.2 K/UL (ref 0–0.5)
EOSINOPHIL NFR BLD: 2 % (ref 0–8)
ERYTHROCYTE [DISTWIDTH] IN BLOOD BY AUTOMATED COUNT: 12.8 % (ref 11.5–14.5)
EST. GFR  (NO RACE VARIABLE): >60 ML/MIN/1.73 M^2
GLUCOSE SERPL-MCNC: 89 MG/DL (ref 70–110)
GLUCOSE UR QL STRIP: NEGATIVE
HCT VFR BLD AUTO: 42.1 % (ref 37–48.5)
HGB BLD-MCNC: 15 G/DL (ref 12–16)
HGB UR QL STRIP: ABNORMAL
HYALINE CASTS #/AREA URNS LPF: 11 /LPF
IMM GRANULOCYTES # BLD AUTO: 0.02 K/UL (ref 0–0.04)
IMM GRANULOCYTES NFR BLD AUTO: 0.2 % (ref 0–0.5)
KETONES UR QL STRIP: NEGATIVE
LEUKOCYTE ESTERASE UR QL STRIP: ABNORMAL
LIPASE SERPL-CCNC: 37 U/L (ref 4–60)
LYMPHOCYTES # BLD AUTO: 2.4 K/UL (ref 1–4.8)
LYMPHOCYTES NFR BLD: 29.8 % (ref 18–48)
MCH RBC QN AUTO: 31.3 PG (ref 27–31)
MCHC RBC AUTO-ENTMCNC: 35.6 G/DL (ref 32–36)
MCV RBC AUTO: 88 FL (ref 82–98)
MICROSCOPIC COMMENT: ABNORMAL
MONOCYTES # BLD AUTO: 0.6 K/UL (ref 0.3–1)
MONOCYTES NFR BLD: 7.1 % (ref 4–15)
NEUTROPHILS # BLD AUTO: 4.9 K/UL (ref 1.8–7.7)
NEUTROPHILS NFR BLD: 60.2 % (ref 38–73)
NITRITE UR QL STRIP: NEGATIVE
NRBC BLD-RTO: 0 /100 WBC
PH UR STRIP: 6 [PH] (ref 5–8)
PLATELET # BLD AUTO: 289 K/UL (ref 150–450)
PMV BLD AUTO: 9.8 FL (ref 9.2–12.9)
POTASSIUM SERPL-SCNC: 3.6 MMOL/L (ref 3.5–5.1)
PROT SERPL-MCNC: 7.7 G/DL (ref 6–8.4)
PROT UR QL STRIP: ABNORMAL
RBC # BLD AUTO: 4.79 M/UL (ref 4–5.4)
RBC #/AREA URNS HPF: 14 /HPF (ref 0–4)
SODIUM SERPL-SCNC: 137 MMOL/L (ref 136–145)
SP GR UR STRIP: 1.03 (ref 1–1.03)
SQUAMOUS #/AREA URNS HPF: 8 /HPF
URN SPEC COLLECT METH UR: ABNORMAL
UROBILINOGEN UR STRIP-ACNC: ABNORMAL EU/DL
WBC # BLD AUTO: 8.2 K/UL (ref 3.9–12.7)
WBC #/AREA URNS HPF: 17 /HPF (ref 0–5)

## 2022-09-11 PROCEDURE — 83690 ASSAY OF LIPASE: CPT | Performed by: NURSE PRACTITIONER

## 2022-09-11 PROCEDURE — 81001 URINALYSIS AUTO W/SCOPE: CPT | Performed by: NURSE PRACTITIONER

## 2022-09-11 PROCEDURE — 99285 EMERGENCY DEPT VISIT HI MDM: CPT | Mod: 25

## 2022-09-11 PROCEDURE — 80053 COMPREHEN METABOLIC PANEL: CPT | Performed by: NURSE PRACTITIONER

## 2022-09-11 PROCEDURE — 85025 COMPLETE CBC W/AUTO DIFF WBC: CPT | Performed by: NURSE PRACTITIONER

## 2022-09-11 PROCEDURE — 81025 URINE PREGNANCY TEST: CPT | Performed by: NURSE PRACTITIONER

## 2022-09-11 PROCEDURE — 25500020 PHARM REV CODE 255: Performed by: EMERGENCY MEDICINE

## 2022-09-11 PROCEDURE — 87086 URINE CULTURE/COLONY COUNT: CPT | Performed by: NURSE PRACTITIONER

## 2022-09-11 RX ADMIN — IOHEXOL 100 ML: 350 INJECTION, SOLUTION INTRAVENOUS at 09:09

## 2022-09-12 VITALS
TEMPERATURE: 98 F | HEART RATE: 70 BPM | DIASTOLIC BLOOD PRESSURE: 78 MMHG | BODY MASS INDEX: 35.88 KG/M2 | RESPIRATION RATE: 17 BRPM | OXYGEN SATURATION: 96 % | HEIGHT: 62 IN | SYSTOLIC BLOOD PRESSURE: 135 MMHG | WEIGHT: 195 LBS

## 2022-09-12 PROCEDURE — 63600175 PHARM REV CODE 636 W HCPCS: Performed by: EMERGENCY MEDICINE

## 2022-09-12 PROCEDURE — 96374 THER/PROPH/DIAG INJ IV PUSH: CPT

## 2022-09-12 RX ORDER — CEPHALEXIN 500 MG/1
500 CAPSULE ORAL 4 TIMES DAILY
Qty: 40 CAPSULE | Refills: 0 | Status: SHIPPED | OUTPATIENT
Start: 2022-09-12 | End: 2022-09-22

## 2022-09-12 RX ADMIN — CEFTRIAXONE 1 G: 1 INJECTION, SOLUTION INTRAVENOUS at 12:09

## 2022-09-12 NOTE — ED PROVIDER NOTES
Encounter Date: 9/11/2022       History     Chief Complaint   Patient presents with    Abdominal Pain     Right upper quad and it has been going on for 4 hours     Chief complaint Is abdominal pain.  She complains of sharp pain right lower quadrant suprapubic area starting at 4:00 p.m. waxes and wanes.  At times the pain is so severe she cannot find a comfortable position.  Denies any trouble urinating.  No history of kidney stones.  She only has a past medical history of hypertension and possible stroke versus a TIA.  She is awake alert stated she does have pain at the present time is comfortable but she is in no apparent distress.      Review of patient's allergies indicates:   Allergen Reactions    Azithromycin     Ciprofloxacin     Latex, natural rubber      Past Medical History:   Diagnosis Date    Hypertension 2019    Stroke 10/07/2021     Past Surgical History:   Procedure Laterality Date    CHOLECYSTECTOMY      TUBAL LIGATION       Family History   Problem Relation Age of Onset    Atrial fibrillation Mother     Hypertension Mother     Arthritis Mother     Hypertension Father     Cancer Sister         colon    Cancer Maternal Grandfather         colon    Cancer Paternal Grandfather         colon    No Known Problems Brother     No Known Problems Son     Atrial fibrillation Maternal Grandmother     No Known Problems Brother     No Known Problems Son     No Known Problems Son      Social History     Tobacco Use    Smoking status: Never    Smokeless tobacco: Never   Substance Use Topics    Alcohol use: Yes     Alcohol/week: 0.0 - 4.0 standard drinks     Comment: socially    Drug use: Never     Review of Systems   Constitutional:  Negative for chills and fever.   HENT:  Negative for ear pain, rhinorrhea and sore throat.    Eyes:  Negative for pain and visual disturbance.   Respiratory:  Negative for cough and shortness of breath.    Cardiovascular:  Negative for chest pain and palpitations.   Gastrointestinal:   Positive for abdominal pain. Negative for constipation, diarrhea, nausea and vomiting.   Genitourinary:  Negative for dysuria, frequency, hematuria and urgency.   Musculoskeletal:  Negative for back pain, joint swelling and myalgias.   Skin:  Negative for rash.   Neurological:  Negative for dizziness, seizures, weakness and headaches.   Psychiatric/Behavioral:  Negative for dysphoric mood. The patient is not nervous/anxious.      Physical Exam     Initial Vitals [09/11/22 1928]   BP Pulse Resp Temp SpO2   (!) 154/97 75 17 97.9 °F (36.6 °C) 99 %      MAP       --         Physical Exam    Nursing note and vitals reviewed.  Constitutional: She appears well-developed and well-nourished.   HENT:   Head: Normocephalic and atraumatic.   Eyes: Conjunctivae, EOM and lids are normal. Pupils are equal, round, and reactive to light.   Neck: Trachea normal. Neck supple. No thyroid mass and no thyromegaly present.   Normal range of motion.  Cardiovascular:  Normal rate, regular rhythm and normal heart sounds.           Pulmonary/Chest: Effort normal and breath sounds normal.   Abdominal: Abdomen is soft. There is no abdominal tenderness.   Tender right lower quadrant positive pinch test.  No rebound positive Rovsing   Musculoskeletal:         General: Normal range of motion.      Cervical back: Normal range of motion and neck supple.     Neurological: She is alert and oriented to person, place, and time. She has normal strength and normal reflexes. No cranial nerve deficit or sensory deficit.   Skin: Skin is warm and dry.   Psychiatric: She has a normal mood and affect. Her speech is normal and behavior is normal. Judgment and thought content normal.       ED Course   Procedures  Labs Reviewed   CBC W/ AUTO DIFFERENTIAL - Abnormal; Notable for the following components:       Result Value    MCH 31.3 (*)     All other components within normal limits    Narrative:     For upper or mid abdominal pain.   URINALYSIS, REFLEX TO URINE  CULTURE - Abnormal; Notable for the following components:    Appearance, UA Hazy (*)     Protein, UA Trace (*)     Occult Blood UA Trace (*)     Urobilinogen, UA 2.0-3.0 (*)     Leukocytes, UA 1+ (*)     All other components within normal limits    Narrative:     In and Out Cath as needed it patient unable to void  Specimen Source->Urine   URINALYSIS MICROSCOPIC - Abnormal; Notable for the following components:    RBC, UA 14 (*)     WBC, UA 17 (*)     Hyaline Casts, UA 11 (*)     All other components within normal limits    Narrative:     In and Out Cath as needed it patient unable to void  Specimen Source->Urine   CULTURE, URINE    Narrative:     In and Out Cath as needed it patient unable to void  Specimen Source->Urine   COMPREHENSIVE METABOLIC PANEL    Narrative:     For upper or mid abdominal pain.   LIPASE    Narrative:     For upper or mid abdominal pain.   POCT URINE PREGNANCY          Imaging Results              US Pelvis Complete Non OB (Final result)  Result time 09/11/22 23:07:18      Final result by Soraya Kidd MD (09/11/22 23:07:18)                   Narrative:    PROCEDURE:  US PELVIS  dated  9/11/2022 10:13 PM CDT    CLINICAL HISTORY:   Female 47 years of age.   abdominal pain    TECHNIQUE:  Transabdominal Sonographic evaluation of the pelvis was performed.    PREVIOUS STUDIES:  None Available    FINDINGS:    Mildly filled urinary bladder.  Uterus 9.2 cm x 4.5 cm x 6.0 cm. Double layer endometrial stripe thickness of 4 mm. IUD in place.  Right ovary 3.4 cm x 1.7 cm x 2.2 cm. Left ovary 3.1 cm x 1.6 cm x 1.8 cm. Flow demonstrated within the ovaries by Doppler. No adnexal mass. No free fluid.    IMPRESSION:    Negative pelvic ultrasound.    Electronically signed by:  Soraya Bragg MD  9/11/2022 11:07 PM CDT Workstation: [id number]                                     CT Abdomen Pelvis With Contrast (Final result)  Result time 09/11/22 22:08:09      Final result by Soraya Kidd MD (09/11/22  22:08:09)                   Narrative:    EXAM:  CT Abdomen and Pelvis With Intravenous Contrast    CLINICAL HISTORY:  RLQ abdominal pain (Age >= 14y)    TECHNIQUE:  Axial computed tomography images of the abdomen and pelvis with intravenous contrast.  Sagittal and coronal reformatted images were created and reviewed.  This CT exam was performed using one or more of the following dose reduction techniques:  automated exposure control, adjustment of the mA and/or kV according to patient size, and/or use of iterative reconstruction technique.    COMPARISON:  No relevant prior studies available.    FINDINGS:  Lung bases:  No acute infiltrate.    ABDOMEN:  Liver:  No abnormality noted.  Gallbladder and bile ducts:  Prior cholecystectomy.  No ductal dilation.  Pancreas:  Homogeneous enhancement.  No mass, inflammation or ductal dilation.  Spleen:  No splenomegaly.  Adrenals:  No mass.  Kidneys and ureters:  No solid mass.  No hydronephrosis.  Stomach and bowel:  Grossly normal stomach.  Nondilated and nonthickened bowel.    PELVIS:  Appendix:  A few appendicoliths within the appendix. No acute appendicitis.  Bladder:  No mass.  Reproductive:  Anteverted uterus with IUD in place.    ABDOMEN and PELVIS:  Intraperitoneal space:  No free air.  No significant fluid collection.  Bones/joints:  Normal bones.  Soft tissues:  Normal body wall soft tissues.  Vasculature:  Normal aorta.  No abdominal aortic aneurysm.  Lymph nodes:  No enlarged lymph nodes.    IMPRESSION:  1.  No acute intra-abdominal or pelvic abnormality.  2.  No findings to account for right lower quadrant pain.  3.  A few appendicoliths within the appendix. No acute appendicitis.    Electronically signed by:  Soraya Bragg MD  9/11/2022 10:08 PM CDT Workstation: [id number]                                     Medications   iohexoL (OMNIPAQUE 350) injection 100 mL (100 mLs Intravenous Given 9/11/22 2142)   cefTRIAXone (ROCEPHIN) 1 g/50 mL D5W IVPB (0 g  Intravenous Stopped 9/12/22 0105)     Medical Decision Making:   ED Management:  The patient had a negative CT positive UA for UTI and was given antibiotics and discharged with instructions                    Clinical Impression:   Final diagnoses:  [R10.9] Abdominal pain  [N39.0, R31.9] Urinary tract infection with hematuria, site unspecified (Primary)        ED Disposition Condition    Discharge Stable          ED Prescriptions       Medication Sig Dispense Start Date End Date Auth. Provider    cephALEXin (KEFLEX) 500 MG capsule Take 1 capsule (500 mg total) by mouth 4 (four) times daily. for 10 days 40 capsule 9/12/2022 9/22/2022 Juaquin Arredondo MD          Follow-up Information       Follow up With Specialties Details Why Contact Info    Enid Joseph MD Family Medicine Schedule an appointment as soon as possible for a visit in 1 day  [address]  [address]  [place] [facility] [address]  [phone]               Juaquin Arredondo MD  09/13/22 2360

## 2022-09-12 NOTE — ED NOTES
"C/o RLQ intermittent "sharp" abd pain since approx 1600 that radiates into groin. Denies trauma. Pain rated 9/10. RR e/u. Tenderness to BLQ. Normal bm. -n/v. Denies urinary s/s. MAEW. +CMS. VSS. NADN.   "

## 2022-09-13 LAB
BACTERIA UR CULT: NORMAL
BACTERIA UR CULT: NORMAL

## 2022-12-19 ENCOUNTER — OFFICE VISIT (OUTPATIENT)
Dept: FAMILY MEDICINE | Facility: CLINIC | Age: 47
End: 2022-12-19
Payer: COMMERCIAL

## 2022-12-19 VITALS
OXYGEN SATURATION: 99 % | WEIGHT: 201 LBS | DIASTOLIC BLOOD PRESSURE: 74 MMHG | BODY MASS INDEX: 36.99 KG/M2 | SYSTOLIC BLOOD PRESSURE: 124 MMHG | HEART RATE: 76 BPM | HEIGHT: 62 IN | RESPIRATION RATE: 18 BRPM

## 2022-12-19 DIAGNOSIS — Z12.31 BREAST CANCER SCREENING BY MAMMOGRAM: ICD-10-CM

## 2022-12-19 DIAGNOSIS — N84.1 CERVICAL POLYP: ICD-10-CM

## 2022-12-19 DIAGNOSIS — Z12.4 CERVICAL CANCER SCREENING: Primary | ICD-10-CM

## 2022-12-19 PROCEDURE — 99396 PR PREVENTIVE VISIT,EST,40-64: ICD-10-PCS | Mod: S$GLB,,, | Performed by: FAMILY MEDICINE

## 2022-12-19 PROCEDURE — 99396 PREV VISIT EST AGE 40-64: CPT | Mod: S$GLB,,, | Performed by: FAMILY MEDICINE

## 2022-12-19 NOTE — PROGRESS NOTES
HPI:  Sirisha Loco is a 47 y.o. F who is here today for her Well Woman Exam    - LMP: 2022 (did not have a period Sept or Oct)  - Last pap: 2y ago  - History of abnormal paps: Yes - remote (>20y ago,then normal)  - Age of menarche: 12 y.o.  - Having regular menstrual periods: No  - Periods occur about every 28 days (but has skipped a couple of months as of late), and last 5-7 days.  - Menstrual flow is mild and is not painful.  - On birth control: Yes - Mirena IUD  - History of pregnancies:   - History of STIs: None  - Personal of family history of cervical, uterine, or endometrial cancer: Yes - Mom w Uterine CA  - Last mammogram: last year  - History of abnormal mammograms: No  - Personal or family history of breast cancer: No    ROS:   Denies urinary symptoms including no dysuria, hematuria, or urgency.  Denies abnormal vaginal discharge.  Had breast pain a couple of months ago for a week, went away on its own. Denies changes in breast skin or nipple discharge.  Denies fevers, chills, or unintentional weight loss.    Physical Exam:  Vitals:    22 1346   BP: 124/74   Pulse: 76   Resp: 18     Physical Exam  Vitals reviewed. Exam conducted with a chaperone present.   Constitutional:       General: She is not in acute distress.     Appearance: She is not ill-appearing.   Pulmonary:      Effort: Pulmonary effort is normal.   Genitourinary:     Exam position: Lithotomy position.      Vagina: Normal. No vaginal discharge, tenderness or lesions.            Comments: IUD visible at os.  Neurological:      General: No focal deficit present.      Mental Status: She is alert and oriented to person, place, and time.   Psychiatric:         Mood and Affect: Mood normal.         Behavior: Behavior normal.       Assessment/Plan:       Problem List Items Addressed This Visit    None  Visit Diagnoses       Cervical cancer screening    -  Primary    Relevant Orders    Gynecologic Pap Test, Liquid-based Prep and  Chlamydia/Gonococcus/Trichomonas, YAMIL and (HPV)    Breast cancer screening by mammogram        Relevant Orders    Mammo Digital Screening Bilat w/ [facility]    Cervical polyp              Collected pap and sent to lab with HPV cotesting as well as STI panel per pt preference. Discussed cervical polyp as benign lesion; monitor for possible bleeding. Order placed for mammo.

## 2022-12-26 LAB
C TRACH RRNA CVX QL NAA+PROBE: NEGATIVE
CYTOLOGIST CVX/VAG CYTO: NORMAL
CYTOLOGY CVX/VAG DOC CYTO: NORMAL
CYTOLOGY CVX/VAG DOC THIN PREP: NORMAL
DX ICD CODE: NORMAL
HPV I/H RISK 4 DNA CVX QL PROBE+SIG AMP: NEGATIVE
Lab: NORMAL
N GONORRHOEA RRNA CVX QL NAA+PROBE: NEGATIVE
OTHER STN SPEC: NORMAL
STAT OF ADQ CVX/VAG CYTO-IMP: NORMAL
T VAGINALIS RRNA SPEC QL NAA+PROBE: NEGATIVE

## 2023-01-03 ENCOUNTER — HOSPITAL ENCOUNTER (OUTPATIENT)
Dept: RADIOLOGY | Facility: HOSPITAL | Age: 48
Discharge: HOME | End: 2023-01-03
Attending: FAMILY MEDICINE
Payer: COMMERCIAL

## 2023-01-03 DIAGNOSIS — Z12.31 BREAST CANCER SCREENING BY MAMMOGRAM: ICD-10-CM

## 2023-01-03 PROCEDURE — 77067 SCR MAMMO BI INCL CAD: CPT | Mod: TC,PO

## 2023-01-26 ENCOUNTER — OFFICE VISIT (OUTPATIENT)
Dept: FAMILY MEDICINE | Facility: CLINIC | Age: 48
End: 2023-01-26
Payer: COMMERCIAL

## 2023-01-26 VITALS
HEIGHT: 62 IN | DIASTOLIC BLOOD PRESSURE: 72 MMHG | RESPIRATION RATE: 20 BRPM | HEART RATE: 72 BPM | SYSTOLIC BLOOD PRESSURE: 121 MMHG | BODY MASS INDEX: 37.02 KG/M2 | TEMPERATURE: 98 F | WEIGHT: 201.19 LBS

## 2023-01-26 DIAGNOSIS — R30.0 DYSURIA: Primary | ICD-10-CM

## 2023-01-26 DIAGNOSIS — Z23 NEEDS FLU SHOT: ICD-10-CM

## 2023-01-26 LAB
BILIRUB UR QL STRIP: NEGATIVE
GLUCOSE UR QL STRIP: NEGATIVE
KETONES UR QL STRIP: NEGATIVE
LEUKOCYTE ESTERASE UR QL STRIP: NEGATIVE
PH, POC UA: 6.5 (ref 5–8.5)
POC BLOOD, URINE: NEGATIVE
POC NITRATES, URINE: NEGATIVE
PROT UR QL STRIP: NEGATIVE
SP GR UR STRIP: 1.01 (ref 1–1.03)
UROBILINOGEN UR STRIP-ACNC: NORMAL (ref 0.2–8)

## 2023-01-26 PROCEDURE — 81003 POCT URINALYSIS, DIPSTICK, AUTOMATED, W/O SCOPE: ICD-10-PCS | Mod: QW,S$GLB,, | Performed by: NURSE PRACTITIONER

## 2023-01-26 PROCEDURE — 1159F PR MEDICATION LIST DOCUMENTED IN MEDICAL RECORD: ICD-10-PCS | Mod: CPTII,S$GLB,, | Performed by: NURSE PRACTITIONER

## 2023-01-26 PROCEDURE — 3074F SYST BP LT 130 MM HG: CPT | Mod: CPTII,S$GLB,, | Performed by: NURSE PRACTITIONER

## 2023-01-26 PROCEDURE — 3074F PR MOST RECENT SYSTOLIC BLOOD PRESSURE < 130 MM HG: ICD-10-PCS | Mod: CPTII,S$GLB,, | Performed by: NURSE PRACTITIONER

## 2023-01-26 PROCEDURE — 81003 URINALYSIS AUTO W/O SCOPE: CPT | Mod: QW,S$GLB,, | Performed by: NURSE PRACTITIONER

## 2023-01-26 PROCEDURE — 1159F MED LIST DOCD IN RCRD: CPT | Mod: CPTII,S$GLB,, | Performed by: NURSE PRACTITIONER

## 2023-01-26 PROCEDURE — 3078F PR MOST RECENT DIASTOLIC BLOOD PRESSURE < 80 MM HG: ICD-10-PCS | Mod: CPTII,S$GLB,, | Performed by: NURSE PRACTITIONER

## 2023-01-26 PROCEDURE — 3008F BODY MASS INDEX DOCD: CPT | Mod: CPTII,S$GLB,, | Performed by: NURSE PRACTITIONER

## 2023-01-26 PROCEDURE — 90686 FLU VACCINE (QUAD) GREATER THAN OR EQUAL TO 3YO PRESERVATIVE FREE IM: ICD-10-PCS | Mod: S$GLB,,, | Performed by: NURSE PRACTITIONER

## 2023-01-26 PROCEDURE — 3008F PR BODY MASS INDEX (BMI) DOCUMENTED: ICD-10-PCS | Mod: CPTII,S$GLB,, | Performed by: NURSE PRACTITIONER

## 2023-01-26 PROCEDURE — 87086 URINE CULTURE/COLONY COUNT: CPT | Performed by: NURSE PRACTITIONER

## 2023-01-26 PROCEDURE — 3078F DIAST BP <80 MM HG: CPT | Mod: CPTII,S$GLB,, | Performed by: NURSE PRACTITIONER

## 2023-01-26 PROCEDURE — 90471 FLU VACCINE (QUAD) GREATER THAN OR EQUAL TO 3YO PRESERVATIVE FREE IM: ICD-10-PCS | Mod: S$GLB,,, | Performed by: NURSE PRACTITIONER

## 2023-01-26 PROCEDURE — 90471 IMMUNIZATION ADMIN: CPT | Mod: S$GLB,,, | Performed by: NURSE PRACTITIONER

## 2023-01-26 PROCEDURE — 99213 OFFICE O/P EST LOW 20 MIN: CPT | Mod: 25,S$GLB,, | Performed by: NURSE PRACTITIONER

## 2023-01-26 PROCEDURE — 90686 IIV4 VACC NO PRSV 0.5 ML IM: CPT | Mod: S$GLB,,, | Performed by: NURSE PRACTITIONER

## 2023-01-26 PROCEDURE — 99213 PR OFFICE/OUTPT VISIT, EST, LEVL III, 20-29 MIN: ICD-10-PCS | Mod: 25,S$GLB,, | Performed by: NURSE PRACTITIONER

## 2023-01-26 NOTE — PROGRESS NOTES
Patient ID: Sirisha Loco is a 47 y.o. female.    Chief Complaint: Urinary Tract Infection (46 yo female c/o vaginal discomfort with burning during urination times 1 week. Pt report no fever or chills. KM)    Ms. Cueto is a very pleasant 46 yo who presents today for evaluation of dysuria. She states she has been having pain on urination for approximately 7 days. She states she had a pap done by Dr. Davis approximately 2 weeks ago. She denies any fever or chills at this time. She states she is otherwise well and denies any other issues or complaints.     Urinary Tract Infection   This is a new problem. The current episode started in the past 7 days. The problem occurs every urination. The problem has been waxing and waning. The quality of the pain is described as aching. The pain is at a severity of 3/10. The pain is moderate. There has been no fever. She is Sexually active. There is No history of pyelonephritis. Associated symptoms include frequency and urgency. Pertinent negatives include no behavior changes, chills, discharge, flank pain, hematuria, hesitancy, nausea, possible pregnancy, sweats, vomiting, weight loss, constipation, rash or withholding. Her past medical history is significant for hypertension. There is no history of catheterization, diabetes insipidus, diabetes mellitus, genitourinary reflux, kidney stones, recurrent UTIs, a single kidney, STD, urinary stasis or a urological procedure.   Dysuria   This is a new problem. The current episode started in the past 7 days. The problem occurs intermittently. The problem has been gradually worsening. The quality of the pain is described as aching. The pain is at a severity of 3/10. The pain is mild. There has been no fever. She is Sexually active. There is No history of pyelonephritis. Associated symptoms include frequency and urgency. Pertinent negatives include no behavior changes, chills, discharge, flank pain, hematuria, hesitancy, nausea,  possible pregnancy, sweats, vomiting, weight loss, constipation, rash or withholding. She has tried nothing for the symptoms. The treatment provided no relief. Her past medical history is significant for hypertension. There is no history of catheterization, diabetes insipidus, diabetes mellitus, genitourinary reflux, kidney stones, recurrent UTIs, a single kidney, STD, urinary stasis or a urological procedure.         Past Medical History:   Diagnosis Date    Hypertension 2019    Stroke 10/07/2021     Past Surgical History:   Procedure Laterality Date    CHOLECYSTECTOMY      TUBAL LIGATION           Tobacco History:  reports that she has never smoked. She has never used smokeless tobacco.      Review of patient's allergies indicates:   Allergen Reactions    Azithromycin     Ciprofloxacin     Latex, natural rubber        Current Outpatient Medications:     acetaminophen (TYLENOL) 325 MG tablet, Take 325 mg by mouth every 6 (six) hours as needed for Pain., Disp: , Rfl:     amLODIPine (NORVASC) 2.5 MG tablet, Take 1 tablet (2.5 mg total) by mouth once daily., Disp: 90 tablet, Rfl: 1    aspirin (ECOTRIN) 81 MG EC tablet, Take 81 mg by mouth once daily., Disp: , Rfl:     cetirizine (ZYRTEC) 10 MG tablet, Take 1 tablet (10 mg total) by mouth once daily., Disp: 90 tablet, Rfl: 1    fluticasone propionate (FLONASE) 50 mcg/actuation nasal spray, 1 spray (50 mcg total) by Each Nostril route once daily., Disp: 13 g, Rfl: 5    hydroCHLOROthiazide (HYDRODIURIL) 25 MG tablet, Take 1 tablet (25 mg total) by mouth once daily., Disp: 90 tablet, Rfl: 1    pravastatin (PRAVACHOL) 20 MG tablet, Take 1 tablet (20 mg total) by mouth every evening., Disp: 90 tablet, Rfl: 3    rizatriptan (MAXALT) 10 MG tablet, Take 1 tablet (10 mg total) by mouth as needed for Migraine (take one for HA, if not resolved in 2 hours may repeat dose, max dose 2 tabs in 24hours)., Disp: 11 tablet, Rfl: 1    Review of Systems   Constitutional:  Negative for  "chills and weight loss.   Gastrointestinal:  Negative for constipation, nausea and vomiting.   Genitourinary:  Positive for dysuria, frequency and urgency. Negative for flank pain, hematuria and hesitancy.   Skin:  Negative for rash.        Objective:      Vitals:    01/26/23 0907   BP: 121/72   Pulse: 72   Resp: 20   Temp: 98.3 °F (36.8 °C)   TempSrc: Oral   Weight: 91.3 kg (201 lb 3.2 oz)   Height: 5' 2" (1.575 m)     Physical Exam  Vitals and nursing note reviewed.   Constitutional:       Appearance: Normal appearance. She is well-developed. She is obese.   HENT:      Head: Normocephalic.   Eyes:      Extraocular Movements: Extraocular movements intact.      Conjunctiva/sclera: Conjunctivae normal.      Pupils: Pupils are equal, round, and reactive to light.   Neck:      Vascular: No carotid bruit.   Cardiovascular:      Rate and Rhythm: Normal rate and regular rhythm.      Pulses: Normal pulses.   Pulmonary:      Effort: Pulmonary effort is normal.      Breath sounds: Normal breath sounds.   Abdominal:      General: Bowel sounds are normal.      Palpations: Abdomen is soft.      Tenderness: There is no abdominal tenderness.      Comments: protuberant   Musculoskeletal:         General: Normal range of motion.      Cervical back: Normal range of motion and neck supple.      Right lower leg: No edema.      Left lower leg: No edema.   Skin:     General: Skin is warm and dry.      Capillary Refill: Capillary refill takes less than 2 seconds.   Neurological:      Mental Status: She is alert and oriented to person, place, and time.   Psychiatric:         Thought Content: Thought content normal.         Assessment:       1. Dysuria    2. Needs flu shot           Plan:       Dysuria  -     POCT Urinalysis, Dipstick, Automated, W/O Scope  -     Urine culture    Needs flu shot  -     Influenza - Quadrivalent *Preferred* (6 months+) (PF)      No follow-ups on file.        1/26/2023 Tanisha Gold NP      "

## 2023-01-29 LAB — BACTERIA UR CULT: NO GROWTH

## 2023-06-06 DIAGNOSIS — I10 ESSENTIAL HYPERTENSION: ICD-10-CM

## 2023-06-07 RX ORDER — HYDROCHLOROTHIAZIDE 25 MG/1
25 TABLET ORAL DAILY
Qty: 90 TABLET | Refills: 1 | Status: SHIPPED | OUTPATIENT
Start: 2023-06-07 | End: 2023-08-03 | Stop reason: SDUPTHER

## 2023-06-07 RX ORDER — AMLODIPINE BESYLATE 2.5 MG/1
2.5 TABLET ORAL DAILY
Qty: 90 TABLET | Refills: 0 | Status: SHIPPED | OUTPATIENT
Start: 2023-06-07 | End: 2023-08-03 | Stop reason: SDUPTHER

## 2023-08-03 ENCOUNTER — LAB VISIT (OUTPATIENT)
Dept: LAB | Facility: HOSPITAL | Age: 48
End: 2023-08-03
Attending: FAMILY MEDICINE
Payer: COMMERCIAL

## 2023-08-03 ENCOUNTER — OFFICE VISIT (OUTPATIENT)
Dept: FAMILY MEDICINE | Facility: CLINIC | Age: 48
End: 2023-08-03
Payer: COMMERCIAL

## 2023-08-03 VITALS
DIASTOLIC BLOOD PRESSURE: 82 MMHG | SYSTOLIC BLOOD PRESSURE: 122 MMHG | WEIGHT: 207.19 LBS | RESPIRATION RATE: 18 BRPM | HEART RATE: 72 BPM | OXYGEN SATURATION: 98 % | BODY MASS INDEX: 38.13 KG/M2 | HEIGHT: 62 IN

## 2023-08-03 DIAGNOSIS — I10 ESSENTIAL HYPERTENSION: ICD-10-CM

## 2023-08-03 DIAGNOSIS — E66.01 CLASS 2 SEVERE OBESITY DUE TO EXCESS CALORIES WITH SERIOUS COMORBIDITY AND BODY MASS INDEX (BMI) OF 37.0 TO 37.9 IN ADULT: ICD-10-CM

## 2023-08-03 DIAGNOSIS — E78.5 DYSLIPIDEMIA: ICD-10-CM

## 2023-08-03 DIAGNOSIS — I10 ESSENTIAL HYPERTENSION: Primary | ICD-10-CM

## 2023-08-03 DIAGNOSIS — N95.1 PERIMENOPAUSE: ICD-10-CM

## 2023-08-03 PROBLEM — E66.812 CLASS 2 SEVERE OBESITY DUE TO EXCESS CALORIES WITH SERIOUS COMORBIDITY AND BODY MASS INDEX (BMI) OF 37.0 TO 37.9 IN ADULT: Status: ACTIVE | Noted: 2021-10-06

## 2023-08-03 PROBLEM — E66.9 CLASS 1 OBESITY IN ADULT: Status: RESOLVED | Noted: 2021-10-06 | Resolved: 2023-08-03

## 2023-08-03 PROBLEM — E66.811 CLASS 1 OBESITY IN ADULT: Status: RESOLVED | Noted: 2021-10-06 | Resolved: 2023-08-03

## 2023-08-03 LAB
ALBUMIN SERPL BCP-MCNC: 4.6 G/DL (ref 3.5–5.2)
ALP SERPL-CCNC: 82 U/L (ref 55–135)
ALT SERPL W/O P-5'-P-CCNC: 32 U/L (ref 10–44)
ANION GAP SERPL CALC-SCNC: 6 MMOL/L (ref 8–16)
AST SERPL-CCNC: 22 U/L (ref 10–40)
BILIRUB SERPL-MCNC: 1 MG/DL (ref 0.1–1)
BUN SERPL-MCNC: 16 MG/DL (ref 6–20)
CALCIUM SERPL-MCNC: 9.3 MG/DL (ref 8.7–10.5)
CHLORIDE SERPL-SCNC: 103 MMOL/L (ref 95–110)
CHOLEST SERPL-MCNC: 121 MG/DL (ref 120–199)
CHOLEST/HDLC SERPL: 2.7 {RATIO} (ref 2–5)
CO2 SERPL-SCNC: 29 MMOL/L (ref 23–29)
CREAT SERPL-MCNC: 0.8 MG/DL (ref 0.5–1.4)
EST. GFR  (NO RACE VARIABLE): >60 ML/MIN/1.73 M^2
GLUCOSE SERPL-MCNC: 109 MG/DL (ref 70–110)
HDLC SERPL-MCNC: 45 MG/DL (ref 40–75)
HDLC SERPL: 37.2 % (ref 20–50)
LDLC SERPL CALC-MCNC: 70 MG/DL (ref 63–159)
NONHDLC SERPL-MCNC: 76 MG/DL
POTASSIUM SERPL-SCNC: 3.5 MMOL/L (ref 3.5–5.1)
PROT SERPL-MCNC: 7.6 G/DL (ref 6–8.4)
SODIUM SERPL-SCNC: 138 MMOL/L (ref 136–145)
TRIGL SERPL-MCNC: 30 MG/DL (ref 30–150)

## 2023-08-03 PROCEDURE — 3074F SYST BP LT 130 MM HG: CPT | Mod: CPTII,S$GLB,, | Performed by: FAMILY MEDICINE

## 2023-08-03 PROCEDURE — 80061 LIPID PANEL: CPT | Performed by: FAMILY MEDICINE

## 2023-08-03 PROCEDURE — 80053 COMPREHEN METABOLIC PANEL: CPT | Performed by: FAMILY MEDICINE

## 2023-08-03 PROCEDURE — 3079F PR MOST RECENT DIASTOLIC BLOOD PRESSURE 80-89 MM HG: ICD-10-PCS | Mod: CPTII,S$GLB,, | Performed by: FAMILY MEDICINE

## 2023-08-03 PROCEDURE — 3074F PR MOST RECENT SYSTOLIC BLOOD PRESSURE < 130 MM HG: ICD-10-PCS | Mod: CPTII,S$GLB,, | Performed by: FAMILY MEDICINE

## 2023-08-03 PROCEDURE — 99214 OFFICE O/P EST MOD 30 MIN: CPT | Mod: S$GLB,,, | Performed by: FAMILY MEDICINE

## 2023-08-03 PROCEDURE — 3008F BODY MASS INDEX DOCD: CPT | Mod: CPTII,S$GLB,, | Performed by: FAMILY MEDICINE

## 2023-08-03 PROCEDURE — 36415 COLL VENOUS BLD VENIPUNCTURE: CPT | Performed by: FAMILY MEDICINE

## 2023-08-03 PROCEDURE — 99214 PR OFFICE/OUTPT VISIT, EST, LEVL IV, 30-39 MIN: ICD-10-PCS | Mod: S$GLB,,, | Performed by: FAMILY MEDICINE

## 2023-08-03 PROCEDURE — 3008F PR BODY MASS INDEX (BMI) DOCUMENTED: ICD-10-PCS | Mod: CPTII,S$GLB,, | Performed by: FAMILY MEDICINE

## 2023-08-03 PROCEDURE — 3079F DIAST BP 80-89 MM HG: CPT | Mod: CPTII,S$GLB,, | Performed by: FAMILY MEDICINE

## 2023-08-03 RX ORDER — HYDROCHLOROTHIAZIDE 25 MG/1
25 TABLET ORAL DAILY
Qty: 90 TABLET | Refills: 1 | Status: SHIPPED | OUTPATIENT
Start: 2023-08-03 | End: 2024-04-03 | Stop reason: SDUPTHER

## 2023-08-03 RX ORDER — PRAVASTATIN SODIUM 20 MG/1
20 TABLET ORAL NIGHTLY
Qty: 90 TABLET | Refills: 3 | Status: SHIPPED | OUTPATIENT
Start: 2023-08-03 | End: 2024-04-03 | Stop reason: SDUPTHER

## 2023-08-03 RX ORDER — AMLODIPINE BESYLATE 2.5 MG/1
2.5 TABLET ORAL DAILY
Qty: 90 TABLET | Refills: 1 | Status: SHIPPED | OUTPATIENT
Start: 2023-08-03 | End: 2024-02-27

## 2023-08-03 NOTE — PROGRESS NOTES
SUBJECTIVE:    Patient ID: Sirisha Loco is a 48 y.o. female.    Chief Complaint: Hypertension, Hyperlipidemia, and Menopause (Wants to discuss blood work for hormones)    HPI  Sirisha Loco is a 48 y.o. female with a hx of suspected TIA w normal imaging, HTN, HLD, and Migraines. She comes in for follow up on HTN, HLD. Has not been seen in office for over one year.    Thinks perimenopausal - night sweats (manageable), skin changes (controlled), has IUD so no regular periods per se, but spotting is now noticeably irregular.     Also concerned about her weight. Has changed how much she's eating, what she's eating, adding more protein. Previously doing intermittent fasting. Not reducing carbs much at this time.    *HTN - rx'd Amlodipine 2.5mg and HCTZ 25mg. BP on triage today 122/82. Denies CP, SOB, NI.    *HLD - rx'd Pravastatin 20mg. No myalgias.        No visits with results within 6 Month(s) from this visit.   Latest known visit with results is:   Office Visit on 01/26/2023   Component Date Value Ref Range Status    POC Blood, Urine 01/26/2023 Negative  Negative Final    POC Bilirubin, Urine 01/26/2023 Negative  Negative Final    POC Urobilinogen, Urine 01/26/2023 normal  0.2 - 8 Final    POC Ketones, Urine 01/26/2023 Negative  Negative Final    POC Protein, Urine 01/26/2023 Negative  Negative Final    POC Nitrates, Urine 01/26/2023 Negative  Negative Final    POC Glucose, Urine 01/26/2023 Negative  Negative Final    pH, UA 01/26/2023 6.5  5.0 - 8.5 Final    POC Specific Gravity, Urine 01/26/2023 1.015  1.000 - 1.030 Final    POC Leukocytes, Urine 01/26/2023 Negative  Negative Final    Urine Culture, Routine 01/26/2023 No growth   Final       Past Medical History:   Diagnosis Date    Hypertension 2019    Stroke 10/07/2021     Past Surgical History:   Procedure Laterality Date    CHOLECYSTECTOMY      TUBAL LIGATION       Family History   Problem Relation Age of Onset    Atrial fibrillation Mother      "Hypertension Mother     Arthritis Mother     Hypertension Father     Cancer Sister         colon    Cancer Maternal Grandfather         colon    Cancer Paternal Grandfather         colon    No Known Problems Brother     No Known Problems Son     Atrial fibrillation Maternal Grandmother     No Known Problems Brother     No Known Problems Son     No Known Problems Son        Tobacco History:  reports that she has never smoked. She has never used smokeless tobacco.  Alcohol History:  reports current alcohol use.  Drug History:  reports no history of drug use.    Review of patient's allergies indicates:   Allergen Reactions    Azithromycin     Ciprofloxacin     Latex, natural rubber        Current Outpatient Medications:     acetaminophen (TYLENOL) 325 MG tablet, Take 325 mg by mouth every 6 (six) hours as needed for Pain., Disp: , Rfl:     aspirin (ECOTRIN) 81 MG EC tablet, Take 81 mg by mouth once daily., Disp: , Rfl:     amLODIPine (NORVASC) 2.5 MG tablet, Take 1 tablet (2.5 mg total) by mouth once daily., Disp: 90 tablet, Rfl: 1    hydroCHLOROthiazide (HYDRODIURIL) 25 MG tablet, Take 1 tablet (25 mg total) by mouth once daily., Disp: 90 tablet, Rfl: 1    pravastatin (PRAVACHOL) 20 MG tablet, Take 1 tablet (20 mg total) by mouth every evening., Disp: 90 tablet, Rfl: 3    Review of Systems  As in HPI           Objective:      Vitals:    08/03/23 0811   BP: 122/82   Pulse: 72   Resp: 18   SpO2: 98%   Weight: 94 kg (207 lb 3.2 oz)   Height: 5' 2" (1.575 m)     Physical Exam  Vitals reviewed.   Constitutional:       General: She is not in acute distress.     Appearance: She is obese. She is not ill-appearing.   Cardiovascular:      Rate and Rhythm: Normal rate and regular rhythm.      Pulses: Normal pulses.      Heart sounds: Normal heart sounds.   Pulmonary:      Effort: Pulmonary effort is normal.      Breath sounds: Normal breath sounds.   Musculoskeletal:      Right lower leg: Edema (trace) present.      Left lower " leg: Edema (trace) present.   Neurological:      Mental Status: She is alert and oriented to person, place, and time.   Psychiatric:         Mood and Affect: Mood normal.         Behavior: Behavior normal.              Assessment:       1. Essential hypertension    2. Dyslipidemia    3. Perimenopause    4. Class 2 severe obesity due to excess calories with serious comorbidity and body mass index (BMI) of 37.0 to 37.9 in adult             Plan:       Essential hypertension  -     Comprehensive Metabolic Panel; Future; Expected date: 08/03/2023  -     amLODIPine (NORVASC) 2.5 MG tablet; Take 1 tablet (2.5 mg total) by mouth once daily.  Dispense: 90 tablet; Refill: 1  -     hydroCHLOROthiazide (HYDRODIURIL) 25 MG tablet; Take 1 tablet (25 mg total) by mouth once daily.  Dispense: 90 tablet; Refill: 1    Dyslipidemia  -     Lipid Panel; Future; Expected date: 08/03/2023  -     pravastatin (PRAVACHOL) 20 MG tablet; Take 1 tablet (20 mg total) by mouth every evening.  Dispense: 90 tablet; Refill: 3    Perimenopause    Class 2 severe obesity due to excess calories with serious comorbidity and body mass index (BMI) of 37.0 to 37.9 in adult      - BP well controlled; asxs. Continue current regimen. Will check CMP.  - Compliant with HLD meds, asxs. Will check lipid panel.  - Reviewed common signs and symptoms of perimenopause, which resonate with pt, and discussed potentially adding medication for vasomotor symptoms; pt defers today  - recommended continued increase in lean proteins, curbing carbs such as rice, pastas, tortillas, breads as well as working toward goal of 150 minutes moderate activity per week.     No follow-ups on file.        8/3/2023 Enid Hurt M.D.

## 2023-10-11 ENCOUNTER — HOSPITAL ENCOUNTER (EMERGENCY)
Facility: HOSPITAL | Age: 48
Discharge: HOME | End: 2023-10-11
Attending: EMERGENCY MEDICINE
Payer: COMMERCIAL

## 2023-10-11 VITALS
OXYGEN SATURATION: 97 % | BODY MASS INDEX: 34.96 KG/M2 | RESPIRATION RATE: 14 BRPM | TEMPERATURE: 98 F | HEIGHT: 62 IN | HEART RATE: 74 BPM | DIASTOLIC BLOOD PRESSURE: 71 MMHG | SYSTOLIC BLOOD PRESSURE: 124 MMHG | WEIGHT: 190 LBS

## 2023-10-11 DIAGNOSIS — R07.9 CHEST PAIN: ICD-10-CM

## 2023-10-11 LAB
ALBUMIN SERPL BCP-MCNC: 5.1 G/DL (ref 3.5–5.2)
ALP SERPL-CCNC: 86 U/L (ref 55–135)
ALT SERPL W/O P-5'-P-CCNC: 24 U/L (ref 10–44)
ANION GAP SERPL CALC-SCNC: 9 MMOL/L (ref 8–16)
AST SERPL-CCNC: 20 U/L (ref 10–40)
BASOPHILS # BLD AUTO: 0.04 K/UL (ref 0–0.2)
BASOPHILS NFR BLD: 0.6 % (ref 0–1.9)
BILIRUB SERPL-MCNC: 0.9 MG/DL (ref 0.1–1)
BNP SERPL-MCNC: 19 PG/ML (ref 0–99)
BUN SERPL-MCNC: 15 MG/DL (ref 6–20)
CALCIUM SERPL-MCNC: 10.1 MG/DL (ref 8.7–10.5)
CHLORIDE SERPL-SCNC: 101 MMOL/L (ref 95–110)
CO2 SERPL-SCNC: 28 MMOL/L (ref 23–29)
CREAT SERPL-MCNC: 0.9 MG/DL (ref 0.5–1.4)
D DIMER PPP IA.FEU-MCNC: 0.3 MG/L FEU
DIFFERENTIAL METHOD: ABNORMAL
EOSINOPHIL # BLD AUTO: 0.1 K/UL (ref 0–0.5)
EOSINOPHIL NFR BLD: 1.9 % (ref 0–8)
ERYTHROCYTE [DISTWIDTH] IN BLOOD BY AUTOMATED COUNT: 13.1 % (ref 11.5–14.5)
EST. GFR  (NO RACE VARIABLE): >60 ML/MIN/1.73 M^2
GLUCOSE SERPL-MCNC: 86 MG/DL (ref 70–110)
HCT VFR BLD AUTO: 44.1 % (ref 37–48.5)
HGB BLD-MCNC: 16 G/DL (ref 12–16)
IMM GRANULOCYTES # BLD AUTO: 0.01 K/UL (ref 0–0.04)
IMM GRANULOCYTES NFR BLD AUTO: 0.1 % (ref 0–0.5)
INR PPP: 1 (ref 0.8–1.2)
LYMPHOCYTES # BLD AUTO: 2.3 K/UL (ref 1–4.8)
LYMPHOCYTES NFR BLD: 32.9 % (ref 18–48)
MCH RBC QN AUTO: 32.1 PG (ref 27–31)
MCHC RBC AUTO-ENTMCNC: 36.3 G/DL (ref 32–36)
MCV RBC AUTO: 89 FL (ref 82–98)
MONOCYTES # BLD AUTO: 0.5 K/UL (ref 0.3–1)
MONOCYTES NFR BLD: 7.4 % (ref 4–15)
NEUTROPHILS # BLD AUTO: 3.9 K/UL (ref 1.8–7.7)
NEUTROPHILS NFR BLD: 57.1 % (ref 38–73)
NRBC BLD-RTO: 0 /100 WBC
PLATELET # BLD AUTO: 268 K/UL (ref 150–450)
PMV BLD AUTO: 9.9 FL (ref 9.2–12.9)
POTASSIUM SERPL-SCNC: 3.3 MMOL/L (ref 3.5–5.1)
PROT SERPL-MCNC: 8.1 G/DL (ref 6–8.4)
PROTHROMBIN TIME: 11.4 SEC (ref 9–12.5)
RBC # BLD AUTO: 4.98 M/UL (ref 4–5.4)
SODIUM SERPL-SCNC: 138 MMOL/L (ref 136–145)
TROPONIN I SERPL HS-MCNC: 2.6 PG/ML (ref 0–14.9)
TROPONIN I SERPL HS-MCNC: 4 PG/ML (ref 0–14.9)
WBC # BLD AUTO: 6.87 K/UL (ref 3.9–12.7)

## 2023-10-11 PROCEDURE — 99285 EMERGENCY DEPT VISIT HI MDM: CPT | Mod: 25

## 2023-10-11 PROCEDURE — 80053 COMPREHEN METABOLIC PANEL: CPT | Performed by: EMERGENCY MEDICINE

## 2023-10-11 PROCEDURE — 84484 ASSAY OF TROPONIN QUANT: CPT | Mod: 91 | Performed by: EMERGENCY MEDICINE

## 2023-10-11 PROCEDURE — 93010 EKG 12-LEAD: ICD-10-PCS | Mod: ,,, | Performed by: GENERAL PRACTICE

## 2023-10-11 PROCEDURE — 93005 ELECTROCARDIOGRAM TRACING: CPT | Performed by: GENERAL PRACTICE

## 2023-10-11 PROCEDURE — 25000003 PHARM REV CODE 250: Performed by: EMERGENCY MEDICINE

## 2023-10-11 PROCEDURE — 85379 FIBRIN DEGRADATION QUANT: CPT | Performed by: EMERGENCY MEDICINE

## 2023-10-11 PROCEDURE — 85025 COMPLETE CBC W/AUTO DIFF WBC: CPT | Performed by: EMERGENCY MEDICINE

## 2023-10-11 PROCEDURE — 93010 ELECTROCARDIOGRAM REPORT: CPT | Mod: ,,, | Performed by: GENERAL PRACTICE

## 2023-10-11 PROCEDURE — 83880 ASSAY OF NATRIURETIC PEPTIDE: CPT | Performed by: EMERGENCY MEDICINE

## 2023-10-11 PROCEDURE — 85610 PROTHROMBIN TIME: CPT | Performed by: EMERGENCY MEDICINE

## 2023-10-11 RX ORDER — ASPIRIN 325 MG
325 TABLET ORAL
Status: COMPLETED | OUTPATIENT
Start: 2023-10-11 | End: 2023-10-11

## 2023-10-11 RX ADMIN — ASPIRIN 325 MG ORAL TABLET 325 MG: 325 PILL ORAL at 04:10

## 2023-10-11 NOTE — ED PROVIDER NOTES
Encounter Date: 10/11/2023       History     Chief Complaint   Patient presents with    Chest Pain     48-year-old female presenting with chest and back pain.  The pain began earlier this afternoon.  The pain is right-sided in her right thoracic back, radiating into her right arm and anterior chest.  The pain is both sharp and dull.  She denies alleviating or exacerbating factors.  She says she has sometimes felt shortness a breath with a.  She denies any nausea or diaphoresis.  She denies any cough.  She denies any family CAD history.  She does not smoke cigarettes.  She does have hypertension/hyperlipidemia.    The history is provided by the patient.     Review of patient's allergies indicates:   Allergen Reactions    Azithromycin     Ciprofloxacin     Latex, natural rubber      Past Medical History:   Diagnosis Date    Hypertension 2019    Stroke 10/07/2021     Past Surgical History:   Procedure Laterality Date    CHOLECYSTECTOMY      TUBAL LIGATION       Family History   Problem Relation Age of Onset    Atrial fibrillation Mother     Hypertension Mother     Arthritis Mother     Hypertension Father     Cancer Sister         colon    Cancer Maternal Grandfather         colon    Cancer Paternal Grandfather         colon    No Known Problems Brother     No Known Problems Son     Atrial fibrillation Maternal Grandmother     No Known Problems Brother     No Known Problems Son     No Known Problems Son      Social History     Tobacco Use    Smoking status: Never    Smokeless tobacco: Never   Substance Use Topics    Alcohol use: Yes     Alcohol/week: 0.0 - 4.0 standard drinks of alcohol     Comment: socially    Drug use: Never     Review of Systems   Cardiovascular:  Positive for chest pain.   All other systems reviewed and are negative.      Physical Exam     Initial Vitals [10/11/23 1609]   BP Pulse Resp Temp SpO2   (!) 151/102 73 16 98.2 °F (36.8 °C) 97 %      MAP       --         Physical Exam    Nursing note and  vitals reviewed.  Constitutional: She appears well-developed and well-nourished. She is not diaphoretic. No distress.   HENT:   Head: Normocephalic.   Eyes: Conjunctivae are normal.   Neck: Neck supple.   Normal range of motion.  Cardiovascular:  Normal rate.           Pulmonary/Chest: No respiratory distress. She exhibits no tenderness.   Abdominal: She exhibits no distension.   Musculoskeletal:         General: No edema.      Cervical back: Normal range of motion and neck supple.     Neurological: She is alert. She has normal strength. GCS eye subscore is 4. GCS verbal subscore is 5. GCS motor subscore is 6.   Skin: Skin is warm and dry.   Psychiatric: She has a normal mood and affect.         ED Course   Procedures  Labs Reviewed   CBC W/ AUTO DIFFERENTIAL - Abnormal; Notable for the following components:       Result Value    MCH 32.1 (*)     MCHC 36.3 (*)     All other components within normal limits   COMPREHENSIVE METABOLIC PANEL - Abnormal; Notable for the following components:    Potassium 3.3 (*)     All other components within normal limits   B-TYPE NATRIURETIC PEPTIDE   TROPONIN I HIGH SENSITIVITY   TROPONIN I HIGH SENSITIVITY   PROTIME-INR   D DIMER, QUANTITATIVE     EKG Readings: (Independently Interpreted)   Sinus rhythm.  Seventy-five beats/minute.  Normal axis.  No ST elevation.     ECG Results              EKG 12-lead (In process)  Result time 10/11/23 16:16:26      In process by Interface, Lab In [facility] (10/11/23 16:16:26)                   Narrative:    Test Reason : R07.9,    Vent. Rate : 075 BPM     Atrial Rate : 075 BPM     P-R Int : 158 ms          QRS Dur : 084 ms      QT Int : 390 ms       P-R-T Axes : 034 054 029 degrees     QTc Int : 435 ms    Sinus rhythm with marked sinus arrythmia  Cannot rule out Anterior infarct ,age undetermined  Abnormal ECG  When compared with ECG of 06-OCT-2021 11:49,  No significant change was found    Referred By:             Confirmed By:                                    Imaging Results              X-Ray Chest AP Portable (Final result)  Result time 10/11/23 16:32:17      Final result by Jose Pineda MD (10/11/23 16:32:17)                   Narrative:    Reason: chest pain Chest pain    FINDINGS:  Portable chest at 1621 without comparisons shows normal cardiomediastinal silhouette.    Lungs are clear. Pulmonary vasculature is normal. No acute osseous abnormality.    IMPRESSION:  Negative chest.    Electronically signed by:  Jose Pineda MD  10/11/2023 04:32 PM CDT Workstation: [id number]                                     Medications   aspirin tablet 325 mg (325 mg Oral Given 10/11/23 5291)     Medical Decision Making  48-year-old female with acute atypical chest pain.  EKG shows no ischemic changes.  CXR is clear.  Initial troponin is normal.  D-dimer is normal.  Repeat troponin remains normal.  Patient's heart score is 3, appropriate for outpatient management.  I advised and offered outpatient stress test tomorrow.  Patient declined, says she feels better, and does not want a stress test.  She is agreeable to referral to cardiology clinic.  She understands to return here for worsening chest pain.    Amount and/or Complexity of Data Reviewed  Labs: ordered.  Radiology: ordered.    Risk  OTC drugs.      Additional MDM:   Heart Score:    History:          Moderately suspicious.  ECG:             Normal  Age:               45-65 years  Risk factors: 1-2 risk factors  Troponin:       Less than or equal to normal limit  Heart Score = 3                                Clinical Impression:   Final diagnoses:  [R07.9] Chest pain        ED Disposition Condition    Discharge Stable          ED Prescriptions    None       Follow-up Information       Follow up With Specialties Details Why Contact Info    Rosie Parikh MD Cardiology   [facility] [facility] [address] [facility] [address]  [address]  [facility] [id number]  [phone]               Shun Lewis MD  10/11/23 6768

## 2023-10-11 NOTE — ED NOTES
"PRIVATE ROOM. EVEN AND NON LABORED RESPIRATIONS.  AIRWAY CLEAR.  PULSES REGULAR.  < 3" CAPILLARY REFILL. SKIN WDI.  MAEW.  NON DISTENDED ABDOMEN. ALERT, ORIENTED AND AMBULATORY. NAD AT THIS TIME.  CALL LIGHT IN REACH. FAMILY AT BS.   "

## 2023-10-12 ENCOUNTER — TELEPHONE (OUTPATIENT)
Dept: FAMILY MEDICINE | Facility: CLINIC | Age: 48
End: 2023-10-12
Payer: COMMERCIAL

## 2023-10-12 ENCOUNTER — PATIENT MESSAGE (OUTPATIENT)
Dept: FAMILY MEDICINE | Facility: CLINIC | Age: 48
End: 2023-10-12
Payer: COMMERCIAL

## 2023-10-12 NOTE — DISCHARGE INSTRUCTIONS
Return to the ER for worsening chest pain, or for any other concerns.  Follow-up with cardiology clinic for re-evaluation.

## 2023-10-16 ENCOUNTER — TELEPHONE (OUTPATIENT)
Dept: FAMILY MEDICINE | Facility: CLINIC | Age: 48
End: 2023-10-16
Payer: COMMERCIAL

## 2023-10-18 ENCOUNTER — TELEPHONE (OUTPATIENT)
Dept: FAMILY MEDICINE | Facility: CLINIC | Age: 48
End: 2023-10-18
Payer: COMMERCIAL

## 2024-01-17 DIAGNOSIS — Z12.31 OTHER SCREENING MAMMOGRAM: ICD-10-CM

## 2024-01-22 ENCOUNTER — PATIENT MESSAGE (OUTPATIENT)
Dept: ADMINISTRATIVE | Facility: HOSPITAL | Age: 49
End: 2024-01-22
Payer: COMMERCIAL

## 2024-02-06 ENCOUNTER — OFFICE VISIT (OUTPATIENT)
Dept: CARDIOLOGY | Facility: CLINIC | Age: 49
End: 2024-02-06
Payer: COMMERCIAL

## 2024-02-06 VITALS
WEIGHT: 210 LBS | HEIGHT: 62 IN | BODY MASS INDEX: 38.64 KG/M2 | HEART RATE: 86 BPM | RESPIRATION RATE: 16 BRPM | DIASTOLIC BLOOD PRESSURE: 88 MMHG | SYSTOLIC BLOOD PRESSURE: 138 MMHG | OXYGEN SATURATION: 98 %

## 2024-02-06 DIAGNOSIS — R07.89 NON-CARDIAC CHEST PAIN: Primary | ICD-10-CM

## 2024-02-06 DIAGNOSIS — I10 ESSENTIAL HYPERTENSION: ICD-10-CM

## 2024-02-06 DIAGNOSIS — E78.2 MIXED HYPERLIPIDEMIA: ICD-10-CM

## 2024-02-06 PROCEDURE — 93010 ELECTROCARDIOGRAM REPORT: CPT | Mod: S$GLB,,, | Performed by: GENERAL PRACTICE

## 2024-02-06 PROCEDURE — 93005 ELECTROCARDIOGRAM TRACING: CPT | Mod: S$GLB,,, | Performed by: STUDENT IN AN ORGANIZED HEALTH CARE EDUCATION/TRAINING PROGRAM

## 2024-02-06 PROCEDURE — 3008F BODY MASS INDEX DOCD: CPT | Mod: CPTII,S$GLB,, | Performed by: STUDENT IN AN ORGANIZED HEALTH CARE EDUCATION/TRAINING PROGRAM

## 2024-02-06 PROCEDURE — 1160F RVW MEDS BY RX/DR IN RCRD: CPT | Mod: CPTII,S$GLB,, | Performed by: STUDENT IN AN ORGANIZED HEALTH CARE EDUCATION/TRAINING PROGRAM

## 2024-02-06 PROCEDURE — 1159F MED LIST DOCD IN RCRD: CPT | Mod: CPTII,S$GLB,, | Performed by: STUDENT IN AN ORGANIZED HEALTH CARE EDUCATION/TRAINING PROGRAM

## 2024-02-06 PROCEDURE — 3075F SYST BP GE 130 - 139MM HG: CPT | Mod: CPTII,S$GLB,, | Performed by: STUDENT IN AN ORGANIZED HEALTH CARE EDUCATION/TRAINING PROGRAM

## 2024-02-06 PROCEDURE — 99999 PR PBB SHADOW E&M-EST. PATIENT-LVL V: CPT | Mod: PBBFAC,,, | Performed by: STUDENT IN AN ORGANIZED HEALTH CARE EDUCATION/TRAINING PROGRAM

## 2024-02-06 PROCEDURE — 99204 OFFICE O/P NEW MOD 45 MIN: CPT | Mod: S$GLB,,, | Performed by: STUDENT IN AN ORGANIZED HEALTH CARE EDUCATION/TRAINING PROGRAM

## 2024-02-06 PROCEDURE — 3079F DIAST BP 80-89 MM HG: CPT | Mod: CPTII,S$GLB,, | Performed by: STUDENT IN AN ORGANIZED HEALTH CARE EDUCATION/TRAINING PROGRAM

## 2024-02-16 ENCOUNTER — HOSPITAL ENCOUNTER (OUTPATIENT)
Dept: CARDIOLOGY | Facility: HOSPITAL | Age: 49
Discharge: HOME | End: 2024-02-16
Attending: STUDENT IN AN ORGANIZED HEALTH CARE EDUCATION/TRAINING PROGRAM
Payer: COMMERCIAL

## 2024-02-16 VITALS — HEIGHT: 62 IN | BODY MASS INDEX: 38.64 KG/M2 | WEIGHT: 210 LBS

## 2024-02-16 DIAGNOSIS — I10 ESSENTIAL HYPERTENSION: ICD-10-CM

## 2024-02-16 PROCEDURE — 93306 TTE W/DOPPLER COMPLETE: CPT | Mod: 26,,, | Performed by: INTERNAL MEDICINE

## 2024-02-16 PROCEDURE — 93306 TTE W/DOPPLER COMPLETE: CPT

## 2024-02-26 DIAGNOSIS — I10 ESSENTIAL HYPERTENSION: ICD-10-CM

## 2024-02-27 RX ORDER — AMLODIPINE BESYLATE 2.5 MG/1
2.5 TABLET ORAL
Qty: 90 TABLET | Refills: 0 | Status: SHIPPED | OUTPATIENT
Start: 2024-02-27 | End: 2024-04-03 | Stop reason: SDUPTHER

## 2024-02-29 LAB
AORTIC ROOT ANNULUS: 2.9 CM
AORTIC VALVE CUSP SEPERATION: 1.7 CM
AV INDEX (PROSTH): 0.87
AV MEAN GRADIENT: 4 MMHG
AV PEAK GRADIENT: 8 MMHG
AV VALVE AREA BY VELOCITY RATIO: 2.41 CM²
AV VALVE AREA: 2.73 CM²
AV VELOCITY RATIO: 0.77
BSA FOR ECHO PROCEDURE: 2.04 M2
CV ECHO LV RWT: 0.52 CM
DOP CALC AO PEAK VEL: 1.41 M/S
DOP CALC AO VTI: 28.1 CM
DOP CALC LVOT AREA: 3.1 CM2
DOP CALC LVOT DIAMETER: 2 CM
DOP CALC LVOT PEAK VEL: 1.08 M/S
DOP CALC LVOT STROKE VOLUME: 76.62 CM3
DOP CALCLVOT PEAK VEL VTI: 24.4 CM
E WAVE DECELERATION TIME: 211 MSEC
E/A RATIO: 0.97
E/E' RATIO: 5.74 M/S
ECHO LV POSTERIOR WALL: 1 CM (ref 0.6–1.1)
FRACTIONAL SHORTENING: 40 % (ref 28–44)
INTERVENTRICULAR SEPTUM: 1.01 CM (ref 0.6–1.1)
IVRT: 106 MSEC
LEFT ATRIUM SIZE: 3.9 CM
LEFT INTERNAL DIMENSION IN SYSTOLE: 2.29 CM (ref 2.1–4)
LEFT VENTRICLE DIASTOLIC VOLUME INDEX: 32.15 ML/M2
LEFT VENTRICLE DIASTOLIC VOLUME: 62.7 ML
LEFT VENTRICLE MASS INDEX: 61 G/M2
LEFT VENTRICLE SYSTOLIC VOLUME INDEX: 9.2 ML/M2
LEFT VENTRICLE SYSTOLIC VOLUME: 17.9 ML
LEFT VENTRICULAR INTERNAL DIMENSION IN DIASTOLE: 3.82 CM (ref 3.5–6)
LEFT VENTRICULAR MASS: 119.09 G
LV LATERAL E/E' RATIO: 4.4 M/S
LV SEPTAL E/E' RATIO: 8.25 M/S
LVOT MG: 2 MMHG
LVOT MV: 0.7 CM/S
MV PEAK A VEL: 0.68 M/S
MV PEAK E VEL: 0.66 M/S
MV STENOSIS PRESSURE HALF TIME: 58 MS
MV VALVE AREA P 1/2 METHOD: 3.79 CM2
PISA TR MAX VEL: 2.28 M/S
PV MV: 0.72 M/S
PV PEAK GRADIENT: 5 MMHG
PV PEAK VELOCITY: 1.11 M/S
RA PRESSURE ESTIMATED: 3 MMHG
RIGHT VENTRICULAR END-DIASTOLIC DIMENSION: 2.64 CM
RV TB RVSP: 5 MMHG
TDI LATERAL: 0.15 M/S
TDI SEPTAL: 0.08 M/S
TDI: 0.12 M/S
TR MAX PG: 21 MMHG
TV REST PULMONARY ARTERY PRESSURE: 24 MMHG
Z-SCORE OF LEFT VENTRICULAR DIMENSION IN END DIASTOLE: -3.75
Z-SCORE OF LEFT VENTRICULAR DIMENSION IN END SYSTOLE: -3.17

## 2024-03-10 NOTE — PROGRESS NOTES
Patient ID:  Sirisha Loco  48 y.o.  female      Assessment:       1. Non-cardiac chest pain    2. Essential hypertension    3. Mixed hyperlipidemia          Plan:     Continue Amlodipine and HCTZ for HTN.  Continue pravastatin.   Counseled on heart healthy lifestyle and ASCVD risk factor control.  Obtain TTE to evaluate heart function and structure given the history of HTN.        Subjective:     Chief Complaint   Patient presents with    [facility]    Hospital Follow Up       HPI:  Sirisha Loco is a 48 y.o. female who presents to [facility]. She has history of HTN, obesity, and HLD. She was in the ER in Oct, 2023 with one episode of sharp right thoracic back pain, right arm pain as well as right side of the chest. It was non-exertional. ER work up, including EKG and troponin was unremarkable. She has not had any recurrence of it. She exercises regularly. She does 30 min on treadmill. No angina or anginal equivalent symptoms with exertion or at rest.    Denies any chest pain, dyspnea, orthopnea, PND, peripheral edema, palpitations, syncope or near syncope. She was concerned about the sinus arrhythmia noted on her EKG in the ED. Provided reassurance that it is secondary to normal physiology.     EKG today shows NSR, 86 BPM, normal intervals, narrow QRS complex and no ST-T abnormalities.         The ASCVD Risk score (Ten DK, et al., 2019) failed to calculate for the following reasons:    The valid total cholesterol range is 130 to 320 mg/dL    Review of patient's allergies indicates:   Allergen Reactions    Azithromycin     Ciprofloxacin     Latex, natural rubber        Past Medical History:   Diagnosis Date    Hypertension 2019    Stroke 10/07/2021     Past Surgical History:   Procedure Laterality Date    CHOLECYSTECTOMY      TUBAL LIGATION       Social History     Tobacco Use    Smoking status: Never    Smokeless tobacco: Never   Substance Use Topics    Alcohol use: Yes     Alcohol/week: 0.0 -  4.0 standard drinks of alcohol     Comment: socially    Drug use: Never          REVIEW OF SYSTEMS  CONSTITUTIONAL: No chills, no fatigue, no fever.   EYES: No double vision, no blurred vision  NEURO: No headaches, no dizziness  RESPIRATORY: No cough, no wheezing.    CARDIOVASCULAR: See HPI   GI: No abdominal pain, no melena, no diarrhea, no nausea or vomiting.   : No  dysuria and frequency, no hematuria  SKIN: no bruising, no discoloration  ENDOCRINE: no polyphagia, no heat intolerance, no cold intolerance  PSYCHIATRIC: no depression, no anxiety, no memory loss  MUSCULOSKELETAL: no  neck pain, no muscle weakness,no back pain          Objective:        Vitals:    02/06/24 0925   BP: 138/88   Pulse: 86   Resp: 16       PHYSICAL EXAM  CONSTITUTIONAL: In no apparent distress  HEENT: Normocephalic. Pupils normal and conjunctivae normal. No pallor  NECK: No JVD  LUNGS: B/L air entry to the lungs, clear to auscultation. No rales, wheezing or rhonchi.  HEART: Normal rate and regular rhythm. Normal S1 and S2.  No murmur   ABDOMEN: soft, non-tender; bowel sounds normal  EXTREMITIES: No edema. Good palpable distal pulses.  NEURO: AAO X 3, no gross sensory or motor deficits  SKIN:  No rash  Psych:  Normal affect    Lab Results   Component Value Date    WBC 6.87 10/11/2023    HGB 16.0 10/11/2023    HCT 44.1 10/11/2023     10/11/2023    CHOL 121 08/03/2023    TRIG 30 08/03/2023    HDL 45 08/03/2023    ALT 24 10/11/2023    AST 20 10/11/2023     10/11/2023    K 3.3 (L) 10/11/2023     10/11/2023    CREATININE 0.9 10/11/2023    BUN 15 10/11/2023    CO2 28 10/11/2023    TSH 1.000 02/11/2022    INR 1.0 10/11/2023    HGBA1C 5.0 10/06/2021        @  Lab Results   Component Value Date    CHOL 121 08/03/2023    CHOL 100 (L) 02/11/2022    CHOL 157 10/06/2021     Lab Results   Component Value Date    HDL 45 08/03/2023    HDL 42 02/11/2022    HDL 53 10/06/2021     Lab Results   Component Value Date    LDLCALC 70.0  "08/03/2023    LDLCALC 51.8 (L) 02/11/2022    LDLCALC 94.0 10/06/2021     No results found for: "DLDL"  Lab Results   Component Value Date    TRIG 30 08/03/2023    TRIG 31 02/11/2022    TRIG 50 10/06/2021       f1   Lab Results   Component Value Date    CHOLHDL 37.2 08/03/2023    CHOLHDL 42.0 02/11/2022    CHOLHDL 33.8 10/06/2021            Current Outpatient Medications   Medication Instructions    amLODIPine (NORVASC) 2.5 mg, Oral    aspirin (ECOTRIN) 81 mg, Oral, Daily    hydroCHLOROthiazide (HYDRODIURIL) 25 mg, Oral, Daily    pravastatin (PRAVACHOL) 20 mg, Oral, Nightly                   All pertinent labs, imaging, and EKGs reviewed.  Patient's most recent EKG tracing was personally interpreted by this provider.    Problem List Items Addressed This Visit          Cardiac/Vascular    Essential hypertension    Relevant Orders    Echo (Completed)     Other Visit Diagnoses       Non-cardiac chest pain    -  Primary    Relevant Orders    IN OFFICE EKG 12-LEAD (to Muse)    Mixed hyperlipidemia                Follow up in about 6 weeks (around 3/19/2024).     "

## 2024-03-23 LAB
OHS QRS DURATION: 84 MS
OHS QTC CALCULATION: 386 MS

## 2024-04-03 ENCOUNTER — OFFICE VISIT (OUTPATIENT)
Dept: FAMILY MEDICINE | Facility: CLINIC | Age: 49
End: 2024-04-03
Payer: COMMERCIAL

## 2024-04-03 VITALS
RESPIRATION RATE: 20 BRPM | SYSTOLIC BLOOD PRESSURE: 120 MMHG | DIASTOLIC BLOOD PRESSURE: 74 MMHG | TEMPERATURE: 98 F | HEART RATE: 80 BPM | WEIGHT: 211.19 LBS | HEIGHT: 62 IN | BODY MASS INDEX: 38.86 KG/M2

## 2024-04-03 DIAGNOSIS — Z12.31 BREAST CANCER SCREENING BY MAMMOGRAM: ICD-10-CM

## 2024-04-03 DIAGNOSIS — E78.5 DYSLIPIDEMIA: ICD-10-CM

## 2024-04-03 DIAGNOSIS — I10 ESSENTIAL HYPERTENSION: Primary | ICD-10-CM

## 2024-04-03 PROCEDURE — 99213 OFFICE O/P EST LOW 20 MIN: CPT | Mod: S$GLB,,, | Performed by: NURSE PRACTITIONER

## 2024-04-03 PROCEDURE — 3008F BODY MASS INDEX DOCD: CPT | Mod: CPTII,S$GLB,, | Performed by: NURSE PRACTITIONER

## 2024-04-03 PROCEDURE — 1159F MED LIST DOCD IN RCRD: CPT | Mod: CPTII,S$GLB,, | Performed by: NURSE PRACTITIONER

## 2024-04-03 PROCEDURE — 3078F DIAST BP <80 MM HG: CPT | Mod: CPTII,S$GLB,, | Performed by: NURSE PRACTITIONER

## 2024-04-03 PROCEDURE — 99999 PR PBB SHADOW E&M-EST. PATIENT-LVL IV: CPT | Mod: PBBFAC,,, | Performed by: NURSE PRACTITIONER

## 2024-04-03 PROCEDURE — 3074F SYST BP LT 130 MM HG: CPT | Mod: CPTII,S$GLB,, | Performed by: NURSE PRACTITIONER

## 2024-04-03 RX ORDER — HYDROCHLOROTHIAZIDE 25 MG/1
25 TABLET ORAL DAILY
Qty: 90 TABLET | Refills: 1 | Status: SHIPPED | OUTPATIENT
Start: 2024-04-03

## 2024-04-03 RX ORDER — PRAVASTATIN SODIUM 20 MG/1
20 TABLET ORAL NIGHTLY
Qty: 90 TABLET | Refills: 1 | Status: SHIPPED | OUTPATIENT
Start: 2024-04-03 | End: 2024-05-27

## 2024-04-03 RX ORDER — AMLODIPINE BESYLATE 2.5 MG/1
2.5 TABLET ORAL DAILY
Qty: 90 TABLET | Refills: 1 | Status: SHIPPED | OUTPATIENT
Start: 2024-04-03

## 2024-04-03 NOTE — PROGRESS NOTES
Patient ID: Sirisha Loco is a 48 y.o. female.    Chief Complaint: Follow-up (47 yo female here for HTN/HLD recheck. Pt states no c/o. KM)    Sirisha Loco is a 48 y.o. female with a hx of suspected TIA w normal imaging, HTN, HLD, and Migraines. She comes in for follow up on HTN, HLD. She states she is doing well overall at the time of this visit and she denies any new issues or complaints.     We reviewed [employer] maintenance.     Follow-up  Pertinent negatives include no arthralgias, chest pain, headaches, joint swelling, neck pain, vomiting or weakness.   Hypertension  This is a chronic problem. The current episode started more than 1 year ago. The problem has been waxing and waning since onset. The problem is controlled. Pertinent negatives include no chest pain, headaches, neck pain or palpitations. Risk factors for coronary artery disease include family history, dyslipidemia and obesity. Past treatments include calcium channel blockers. The current treatment provides mild improvement. Compliance problems include diet and exercise.  There is no history of angina, kidney disease, CAD/MI, CVA, heart failure, left ventricular hypertrophy, PVD or retinopathy.           Past Medical History:   Diagnosis Date    Hypertension 2019    Stroke 10/07/2021     Past Surgical History:   Procedure Laterality Date    CHOLECYSTECTOMY      TUBAL LIGATION           Tobacco History:  reports that she has quit smoking. Her smoking use included cigarettes. She has been exposed to tobacco smoke. She has never used smokeless tobacco.      Review of patient's allergies indicates:   Allergen Reactions    Azithromycin     Ciprofloxacin     Latex, natural rubber        Current Outpatient Medications:     aspirin (ECOTRIN) 81 MG EC tablet, Take 81 mg by mouth once daily., Disp: , Rfl:     amLODIPine (NORVASC) 2.5 MG tablet, Take 1 tablet (2.5 mg total) by mouth once daily., Disp: 90 tablet, Rfl: 1    hydroCHLOROthiazide  "(HYDRODIURIL) 25 MG tablet, Take 1 tablet (25 mg total) by mouth once daily., Disp: 90 tablet, Rfl: 1    pravastatin (PRAVACHOL) 20 MG tablet, Take 1 tablet (20 mg total) by mouth every evening., Disp: 90 tablet, Rfl: 1    Review of Systems   Constitutional:  Negative for activity change and unexpected weight change.   HENT:  Negative for hearing loss, rhinorrhea and trouble swallowing.    Eyes:  Negative for discharge and visual disturbance.   Respiratory:  Negative for chest tightness and wheezing.    Cardiovascular:  Negative for chest pain and palpitations.   Gastrointestinal:  Negative for blood in stool, constipation, diarrhea and vomiting.   Endocrine: Negative for polydipsia and polyuria.   Genitourinary:  Negative for difficulty urinating, dysuria, hematuria and menstrual problem.   Musculoskeletal:  Negative for arthralgias, joint swelling and neck pain.   Neurological:  Negative for weakness and headaches.   Psychiatric/Behavioral:  Negative for confusion and dysphoric mood.           Objective:      Vitals:    04/03/24 1404   BP: 120/74   Pulse: 80   Resp: 20   Temp: 97.9 °F (36.6 °C)   TempSrc: Oral   Weight: 95.8 kg (211 lb 3.2 oz)   Height: 5' 2" (1.575 m)     Physical Exam  Vitals and nursing note reviewed.   Constitutional:       Appearance: Normal appearance. She is well-developed. She is obese.   HENT:      Head: Normocephalic.   Eyes:      Extraocular Movements: Extraocular movements intact.      Conjunctiva/sclera: Conjunctivae normal.      Pupils: Pupils are equal, round, and reactive to light.   Neck:      Vascular: No carotid bruit.   Cardiovascular:      Rate and Rhythm: Normal rate and regular rhythm.      Pulses: Normal pulses.   Pulmonary:      Effort: Pulmonary effort is normal.      Breath sounds: Normal breath sounds.   Abdominal:      General: Bowel sounds are normal.      Palpations: Abdomen is soft.      Tenderness: There is no abdominal tenderness.      Comments: protuberant "   Musculoskeletal:         General: Normal range of motion.      Cervical back: Normal range of motion and neck supple.      Right lower leg: No edema.      Left lower leg: No edema.   Skin:     General: Skin is warm and dry.      Capillary Refill: Capillary refill takes less than 2 seconds.   Neurological:      Mental Status: She is alert and oriented to person, place, and time.   Psychiatric:         Thought Content: Thought content normal.           Assessment:       1. Essential hypertension    2. Dyslipidemia    3. Breast cancer screening by mammogram           Plan:       Essential hypertension  -     COMPREHENSIVE METABOLIC PANEL; Future; Expected date: 04/03/2024  -     HEMOGLOBIN A1C; Future; Expected date: 04/03/2024  -     hydroCHLOROthiazide (HYDRODIURIL) 25 MG tablet; Take 1 tablet (25 mg total) by mouth once daily.  Dispense: 90 tablet; Refill: 1  -     amLODIPine (NORVASC) 2.5 MG tablet; Take 1 tablet (2.5 mg total) by mouth once daily.  Dispense: 90 tablet; Refill: 1    Dyslipidemia  -     LIPID PANEL; Future; Expected date: 04/03/2024  -     pravastatin (PRAVACHOL) 20 MG tablet; Take 1 tablet (20 mg total) by mouth every evening.  Dispense: 90 tablet; Refill: 1    Breast cancer screening by mammogram  -     Mammo Digital Screening Bilat w/ [facility]; Future; Expected date: 06/03/2024      Follow up in about 6 months (around 10/3/2024), or if symptoms worsen or fail to improve.        4/3/2024 Tanisha Gold NP      Answers submitted by the patient for this visit:  Review of Systems Questionnaire (Submitted on 4/2/2024)  activity change: No  unexpected weight change: No  neck pain: No  hearing loss: No  rhinorrhea: No  trouble swallowing: No  eye discharge: No  visual disturbance: No  chest tightness: No  wheezing: No  chest pain: No  palpitations: No  blood in stool: No  constipation: No  vomiting: No  diarrhea: No  polydipsia: No  polyuria: No  difficulty urinating: No  hematuria: No  menstrual  problem: No  dysuria: No  joint swelling: No  arthralgias: No  headaches: No  weakness: No  confusion: No  dysphoric mood: No

## 2024-04-04 ENCOUNTER — LAB VISIT (OUTPATIENT)
Dept: LAB | Facility: HOSPITAL | Age: 49
End: 2024-04-04
Attending: NURSE PRACTITIONER
Payer: COMMERCIAL

## 2024-04-04 DIAGNOSIS — I10 ESSENTIAL HYPERTENSION: ICD-10-CM

## 2024-04-04 DIAGNOSIS — E78.5 DYSLIPIDEMIA: ICD-10-CM

## 2024-04-04 LAB
ALBUMIN SERPL BCP-MCNC: 4.1 G/DL (ref 3.5–5.2)
ALP SERPL-CCNC: 80 U/L (ref 55–135)
ALT SERPL W/O P-5'-P-CCNC: 43 U/L (ref 10–44)
ANION GAP SERPL CALC-SCNC: 9 MMOL/L (ref 8–16)
AST SERPL-CCNC: 30 U/L (ref 10–40)
BILIRUB SERPL-MCNC: 0.8 MG/DL (ref 0.1–1)
BUN SERPL-MCNC: 15 MG/DL (ref 6–20)
CALCIUM SERPL-MCNC: 9.5 MG/DL (ref 8.7–10.5)
CHLORIDE SERPL-SCNC: 108 MMOL/L (ref 95–110)
CHOLEST SERPL-MCNC: 127 MG/DL (ref 120–199)
CHOLEST/HDLC SERPL: 3 {RATIO} (ref 2–5)
CO2 SERPL-SCNC: 20 MMOL/L (ref 23–29)
CREAT SERPL-MCNC: 0.8 MG/DL (ref 0.5–1.4)
EST. GFR  (NO RACE VARIABLE): >60 ML/MIN/1.73 M^2
ESTIMATED AVG GLUCOSE: 94 MG/DL (ref 68–131)
GLUCOSE SERPL-MCNC: 105 MG/DL (ref 70–110)
HBA1C MFR BLD: 4.9 % (ref 4–5.6)
HDLC SERPL-MCNC: 42 MG/DL (ref 40–75)
HDLC SERPL: 33.1 % (ref 20–50)
LDLC SERPL CALC-MCNC: 74.2 MG/DL (ref 63–159)
NONHDLC SERPL-MCNC: 85 MG/DL
POTASSIUM SERPL-SCNC: 3.6 MMOL/L (ref 3.5–5.1)
PROT SERPL-MCNC: 7.1 G/DL (ref 6–8.4)
SODIUM SERPL-SCNC: 137 MMOL/L (ref 136–145)
TRIGL SERPL-MCNC: 54 MG/DL (ref 30–150)

## 2024-04-04 PROCEDURE — 36415 COLL VENOUS BLD VENIPUNCTURE: CPT | Performed by: NURSE PRACTITIONER

## 2024-04-04 PROCEDURE — 80053 COMPREHEN METABOLIC PANEL: CPT | Performed by: NURSE PRACTITIONER

## 2024-04-04 PROCEDURE — 80061 LIPID PANEL: CPT | Performed by: NURSE PRACTITIONER

## 2024-04-04 PROCEDURE — 83036 HEMOGLOBIN GLYCOSYLATED A1C: CPT | Performed by: NURSE PRACTITIONER

## 2024-05-27 ENCOUNTER — OFFICE VISIT (OUTPATIENT)
Dept: CARDIOLOGY | Facility: CLINIC | Age: 49
End: 2024-05-27
Payer: COMMERCIAL

## 2024-05-27 VITALS
OXYGEN SATURATION: 96 % | SYSTOLIC BLOOD PRESSURE: 130 MMHG | HEART RATE: 71 BPM | HEIGHT: 62 IN | BODY MASS INDEX: 38.59 KG/M2 | DIASTOLIC BLOOD PRESSURE: 60 MMHG | WEIGHT: 209.69 LBS

## 2024-05-27 DIAGNOSIS — I10 PRIMARY HYPERTENSION: Primary | ICD-10-CM

## 2024-05-27 DIAGNOSIS — Z13.6 ENCOUNTER FOR SCREENING FOR CARDIOVASCULAR DISORDERS: ICD-10-CM

## 2024-05-27 PROCEDURE — 3078F DIAST BP <80 MM HG: CPT | Mod: CPTII,S$GLB,, | Performed by: STUDENT IN AN ORGANIZED HEALTH CARE EDUCATION/TRAINING PROGRAM

## 2024-05-27 PROCEDURE — 99999 PR PBB SHADOW E&M-EST. PATIENT-LVL III: CPT | Mod: PBBFAC,,, | Performed by: STUDENT IN AN ORGANIZED HEALTH CARE EDUCATION/TRAINING PROGRAM

## 2024-05-27 PROCEDURE — 3008F BODY MASS INDEX DOCD: CPT | Mod: CPTII,S$GLB,, | Performed by: STUDENT IN AN ORGANIZED HEALTH CARE EDUCATION/TRAINING PROGRAM

## 2024-05-27 PROCEDURE — 1160F RVW MEDS BY RX/DR IN RCRD: CPT | Mod: CPTII,S$GLB,, | Performed by: STUDENT IN AN ORGANIZED HEALTH CARE EDUCATION/TRAINING PROGRAM

## 2024-05-27 PROCEDURE — 3044F HG A1C LEVEL LT 7.0%: CPT | Mod: CPTII,S$GLB,, | Performed by: STUDENT IN AN ORGANIZED HEALTH CARE EDUCATION/TRAINING PROGRAM

## 2024-05-27 PROCEDURE — 3075F SYST BP GE 130 - 139MM HG: CPT | Mod: CPTII,S$GLB,, | Performed by: STUDENT IN AN ORGANIZED HEALTH CARE EDUCATION/TRAINING PROGRAM

## 2024-05-27 PROCEDURE — 99214 OFFICE O/P EST MOD 30 MIN: CPT | Mod: S$GLB,,, | Performed by: STUDENT IN AN ORGANIZED HEALTH CARE EDUCATION/TRAINING PROGRAM

## 2024-05-27 PROCEDURE — 1159F MED LIST DOCD IN RCRD: CPT | Mod: CPTII,S$GLB,, | Performed by: STUDENT IN AN ORGANIZED HEALTH CARE EDUCATION/TRAINING PROGRAM

## 2024-05-27 NOTE — PROGRESS NOTES
Patient ID:  Sirisha Loco  48 y.o.  female      Assessment:       No diagnosis found.       Plan:           Subjective:     Chief Complaint   Patient presents with    Hypertension    Results     Echo & labs       HPI:  Sirisha Loco is a 48 y.o. female who presents with      PREVIOUS CARDIAC TESTING/PROCEDURES HISTORY:  Most Recent Echocardiogram Results  Results for orders placed during the hospital encounter of 02/16/24    Echo    Interpretation Summary    Left Ventricle: There is normal systolic function with a visually estimated ejection fraction of 60 - 65%. There is normal diastolic function.    Right Ventricle: Normal right ventricular cavity size. Wall thickness is normal. Right ventricle wall motion  is normal. Systolic function is normal.    Left Atrium: Left atrium is mildly dilated.    Tricuspid Valve: There is mild regurgitation.    IVC/SVC: Normal venous pressure at 3 mmHg.      Most Recent Stress Test Results  No results found for this or any previous visit.      Most Recent Cardiac PET Stress Test Results  No results found for this or any previous visit.      Most Recent Cardiovascular Angiogram results  No results found for this or any previous visit.      Other Most Recent Cardiology Results  Results for orders placed during the hospital encounter of 10/11/23    CARDIAC MONITORING STRIPS      CARDIAC SURGERY:    Most Recent EKG Results  Results for orders placed or performed in visit on 02/06/24   IN OFFICE EKG 12-LEAD (to [place])    Collection Time: 02/06/24  9:20 AM   Result Value Ref Range    QRS Duration 84 ms    [facility] QTC Calculation 386 ms    Narrative    Test Reason : R07.9,    Vent. Rate : 071 BPM     Atrial Rate : 071 BPM     P-R Int : 164 ms          QRS Dur : 084 ms      QT Int : 356 ms       P-R-T Axes : 000 142 153 degrees     QTc Int : 386 ms     Suspect arm lead reversal, interpretation assumes no reversal  Normal sinus rhythm  Inferior infarct ,age undetermined  Anterolateral  infarct (cited on or before 11-OCT-2023)  Abnormal ECG  When compared with ECG of 11-OCT-2023 16:14,  Significant changes have occurred  Confirmed by Sarah Beth DENISE, Sandoval ANGELES ([id number]) on 3/23/2024 3:47:21 PM    Referred By: LEODAN ROJAS           Confirmed By:Sandoval Burleson MD       The ASCVD Risk score (Ten DK, et al., 2019) failed to calculate for the following reasons:    The patient has a prior MI or stroke diagnosis    Review of patient's allergies indicates:   Allergen Reactions    Azithromycin     Ciprofloxacin     Latex, natural rubber        Past Medical History:   Diagnosis Date    Hypertension 2019    Stroke 10/07/2021     Past Surgical History:   Procedure Laterality Date    CHOLECYSTECTOMY      TUBAL LIGATION       Social History     Tobacco Use    Smoking status: Former     Types: Cigarettes     Passive exposure: Past    Smokeless tobacco: Never   Substance Use Topics    Alcohol use: Yes     Alcohol/week: 0.0 - 4.0 standard drinks of alcohol     Comment: socially    Drug use: Never          REVIEW OF SYSTEMS  CONSTITUTIONAL: No chills, no fatigue, no fever.   EYES: No double vision, no blurred vision  NEURO: No headaches, no dizziness  RESPIRATORY: No cough, no wheezing.    CARDIOVASCULAR: See HPI   GI: No abdominal pain, no melena, no diarrhea, no nausea or vomiting.   : No  dysuria and frequency, no hematuria  SKIN: no bruising, no discoloration  ENDOCRINE: no polyphagia, no heat intolerance, no cold intolerance  PSYCHIATRIC: no depression, no anxiety, no memory loss  MUSCULOSKELETAL: no  neck pain, no muscle weakness,no back pain          Objective:        Vitals:    05/27/24 0949   BP: 130/60   Pulse: 71       PHYSICAL EXAM  CONSTITUTIONAL: In no apparent distress  HEENT: Normocephalic. Pupils normal and conjunctivae normal. No pallor  NECK: No JVD  LUNGS: B/L air entry to the lungs, clear to auscultation. No rales, wheezing or rhonchi.  HEART: Normal rate and regular rhythm. Normal S1 and S2.  No  "murmur   ABDOMEN: soft, non-tender; bowel sounds normal  EXTREMITIES: No edema. Good palpable distal pulses.  NEURO: AAO X 3, no gross sensory or motor deficits  SKIN:  No rash  Psych:  Normal affect    Lab Results   Component Value Date    WBC 6.87 10/11/2023    HGB 16.0 10/11/2023    HCT 44.1 10/11/2023     10/11/2023    CHOL 127 04/04/2024    TRIG 54 04/04/2024    HDL 42 04/04/2024    ALT 43 04/04/2024    AST 30 04/04/2024     04/04/2024    K 3.6 04/04/2024     04/04/2024    CREATININE 0.8 04/04/2024    BUN 15 04/04/2024    CO2 20 (L) 04/04/2024    TSH 1.000 02/11/2022    INR 1.0 10/11/2023    HGBA1C 4.9 04/04/2024        @  Lab Results   Component Value Date    CHOL 127 04/04/2024    CHOL 121 08/03/2023    CHOL 100 (L) 02/11/2022     Lab Results   Component Value Date    HDL 42 04/04/2024    HDL 45 08/03/2023    HDL 42 02/11/2022     Lab Results   Component Value Date    LDLCALC 74.2 04/04/2024    LDLCALC 70.0 08/03/2023    LDLCALC 51.8 (L) 02/11/2022     No results found for: "DLDL"  Lab Results   Component Value Date    TRIG 54 04/04/2024    TRIG 30 08/03/2023    TRIG 31 02/11/2022       f1   Lab Results   Component Value Date    CHOLHDL 33.1 04/04/2024    CHOLHDL 37.2 08/03/2023    CHOLHDL 42.0 02/11/2022            Current Outpatient Medications   Medication Instructions    amLODIPine (NORVASC) 2.5 mg, Oral, Daily    aspirin (ECOTRIN) 81 mg, Oral, Daily    hydroCHLOROthiazide (HYDRODIURIL) 25 mg, Oral, Daily       Medication List with Changes/Refills   Current Medications    AMLODIPINE (NORVASC) 2.5 MG TABLET    Take 1 tablet (2.5 mg total) by mouth once daily.    ASPIRIN (ECOTRIN) 81 MG EC TABLET    Take 81 mg by mouth once daily.    HYDROCHLOROTHIAZIDE (HYDRODIURIL) 25 MG TABLET    Take 1 tablet (25 mg total) by mouth once daily.   Discontinued Medications    PRAVASTATIN (PRAVACHOL) 20 MG TABLET    Take 1 tablet (20 mg total) by mouth every evening.               All pertinent labs, " imaging, and EKGs reviewed.  Patient's most recent EKG tracing was personally interpreted by this provider.    Problem List Items Addressed This Visit    None      No follow-ups on file.

## 2024-05-27 NOTE — PROGRESS NOTES
Patient ID:  Sirisha Loco  48 y.o.  female      Assessment:       1. Primary hypertension    2. Encounter for screening for cardiovascular disorders          Plan:     Doing very well from cardiac standpoint. Asymptomatic.   Blood pressure is controlled. Continue amlodipine 2.5 mg daily and hydrochlorothiazide 25 mg daily.  She has stopped taking her pravastatin.  She wants to focus on diet and exercise at this point which is reasonable.  Her 10-year ASCVD risk is 3.4%.      Subjective:     Chief Complaint   Patient presents with    Hypertension    Results     Echo & labs       HPI:  Sirisha Loco is a 48 y.o. female with a history of HTN and obesity.      Denies any chest pain, dyspnea, orthopnea, PND, peripheral edema, palpitations, syncope or near syncope. She has started regimented exercise and has made changes in her diet. No angina or anginal equivalent symptoms with exertion or at rest.  Discussed echo results and answered all questions.      PREVIOUS CARDIAC TESTING/PROCEDURES HISTORY:  Most Recent Echocardiogram Results  Results for orders placed during the hospital encounter of 02/16/24    Echo    Interpretation Summary    Left Ventricle: There is normal systolic function with a visually estimated ejection fraction of 60 - 65%. There is normal diastolic function.    Right Ventricle: Normal right ventricular cavity size. Wall thickness is normal. Right ventricle wall motion  is normal. Systolic function is normal.    Left Atrium: Left atrium is mildly dilated.    Tricuspid Valve: There is mild regurgitation.    IVC/SVC: Normal venous pressure at 3 mmHg.    :    Most Recent EKG Results    EKG today shows NSR, 86 BPM, normal intervals, narrow QRS complex and no ST-T abnormalities.   Results for orders placed or performed in visit on 02/06/24   IN OFFICE EKG 12-LEAD (to [place])    Collection Time: 02/06/24  9:20 AM   Result Value Ref Range    QRS Duration 84 ms    [facility] QTC Calculation 386 ms    Narrative     Test Reason : R07.9,    Vent. Rate : 071 BPM     Atrial Rate : 071 BPM     P-R Int : 164 ms          QRS Dur : 084 ms      QT Int : 356 ms       P-R-T Axes : 000 142 153 degrees     QTc Int : 386 ms     Suspect arm lead reversal, interpretation assumes no reversal  Normal sinus rhythm  Inferior infarct ,age undetermined  Anterolateral infarct (cited on or before 11-OCT-2023)  Abnormal ECG  When compared with ECG of 11-OCT-2023 16:14,  Significant changes have occurred  Confirmed by Sarah Beth DENISE, Sandoval ANGELES ([id number]) on 3/23/2024 3:47:21 PM    Referred By: LEODAN ROJAS           Confirmed By:Sandoval Burleson MD       Review of patient's allergies indicates:   Allergen Reactions    Azithromycin     Ciprofloxacin     Latex, natural rubber        Past Medical History:   Diagnosis Date    Hypertension 2019    Stroke 10/07/2021     Past Surgical History:   Procedure Laterality Date    CHOLECYSTECTOMY      TUBAL LIGATION       Social History     Tobacco Use    Smoking status: Former     Types: Cigarettes     Passive exposure: Past    Smokeless tobacco: Never   Substance Use Topics    Alcohol use: Yes     Alcohol/week: 0.0 - 4.0 standard drinks of alcohol     Comment: socially    Drug use: Never          REVIEW OF SYSTEMS  CONSTITUTIONAL: No chills, no fatigue, no fever.   EYES: No double vision, no blurred vision  NEURO: No headaches, no dizziness  RESPIRATORY: No cough, no wheezing.    CARDIOVASCULAR: See HPI   GI: No abdominal pain, no melena, no diarrhea, no nausea or vomiting.   : No  dysuria and frequency, no hematuria  SKIN: no bruising, no discoloration  ENDOCRINE: no polyphagia, no heat intolerance, no cold intolerance  PSYCHIATRIC: no depression, no anxiety, no memory loss  MUSCULOSKELETAL: no  neck pain, no muscle weakness,no back pain          Objective:        Vitals:    05/27/24 0949   BP: 130/60   Pulse: 71       PHYSICAL EXAM  CONSTITUTIONAL: In no apparent distress  HEENT: Normocephalic. Pupils normal and  "conjunctivae normal. No pallor  NECK: No JVD  LUNGS: B/L air entry to the lungs, clear to auscultation. No rales, wheezing or rhonchi.  HEART: Normal rate and regular rhythm. Normal S1 and S2.  No murmur   ABDOMEN: soft, non-tender; bowel sounds normal  EXTREMITIES: No edema. Good palpable distal pulses.  NEURO: AAO X 3, no gross sensory or motor deficits  SKIN:  No rash  Psych:  Normal affect    Lab Results   Component Value Date    WBC 6.87 10/11/2023    HGB 16.0 10/11/2023    HCT 44.1 10/11/2023     10/11/2023    CHOL 127 04/04/2024    TRIG 54 04/04/2024    HDL 42 04/04/2024    ALT 43 04/04/2024    AST 30 04/04/2024     04/04/2024    K 3.6 04/04/2024     04/04/2024    CREATININE 0.8 04/04/2024    BUN 15 04/04/2024    CO2 20 (L) 04/04/2024    TSH 1.000 02/11/2022    INR 1.0 10/11/2023    HGBA1C 4.9 04/04/2024        @  Lab Results   Component Value Date    CHOL 127 04/04/2024    CHOL 121 08/03/2023    CHOL 100 (L) 02/11/2022     Lab Results   Component Value Date    HDL 42 04/04/2024    HDL 45 08/03/2023    HDL 42 02/11/2022     Lab Results   Component Value Date    LDLCALC 74.2 04/04/2024    LDLCALC 70.0 08/03/2023    LDLCALC 51.8 (L) 02/11/2022     No results found for: "DLDL"  Lab Results   Component Value Date    TRIG 54 04/04/2024    TRIG 30 08/03/2023    TRIG 31 02/11/2022       f1   Lab Results   Component Value Date    CHOLHDL 33.1 04/04/2024    CHOLHDL 37.2 08/03/2023    CHOLHDL 42.0 02/11/2022            Current Outpatient Medications   Medication Instructions    amLODIPine (NORVASC) 2.5 mg, Oral, Daily    aspirin (ECOTRIN) 81 mg, Oral, Daily    hydroCHLOROthiazide (HYDRODIURIL) 25 mg, Oral, Daily             All pertinent labs, imaging, and EKGs reviewed.  Patient's most recent EKG tracing was personally interpreted by this provider.    Problem List Items Addressed This Visit    None  Visit Diagnoses       Primary hypertension    -  Primary    Encounter for screening for " cardiovascular disorders                Follow up in about 6 months (around 11/27/2024).

## 2024-06-05 ENCOUNTER — HOSPITAL ENCOUNTER (OUTPATIENT)
Dept: RADIOLOGY | Facility: HOSPITAL | Age: 49
Discharge: HOME | End: 2024-06-05
Attending: NURSE PRACTITIONER
Payer: COMMERCIAL

## 2024-06-05 VITALS — HEIGHT: 62 IN | BODY MASS INDEX: 38.59 KG/M2 | WEIGHT: 209.69 LBS

## 2024-06-05 DIAGNOSIS — Z12.31 BREAST CANCER SCREENING BY MAMMOGRAM: ICD-10-CM

## 2024-06-05 PROCEDURE — 77063 BREAST TOMOSYNTHESIS BI: CPT | Mod: 26,,, | Performed by: RADIOLOGY

## 2024-06-05 PROCEDURE — 77067 SCR MAMMO BI INCL CAD: CPT | Mod: 26,,, | Performed by: RADIOLOGY

## 2024-06-05 PROCEDURE — 77063 BREAST TOMOSYNTHESIS BI: CPT | Mod: TC,PO

## 2024-07-07 ENCOUNTER — HOSPITAL ENCOUNTER (EMERGENCY)
Facility: HOSPITAL | Age: 49
Discharge: HOME | End: 2024-07-07
Attending: STUDENT IN AN ORGANIZED HEALTH CARE EDUCATION/TRAINING PROGRAM
Payer: COMMERCIAL

## 2024-07-07 VITALS
TEMPERATURE: 99 F | WEIGHT: 205 LBS | OXYGEN SATURATION: 96 % | BODY MASS INDEX: 37.73 KG/M2 | SYSTOLIC BLOOD PRESSURE: 125 MMHG | DIASTOLIC BLOOD PRESSURE: 80 MMHG | RESPIRATION RATE: 18 BRPM | HEART RATE: 70 BPM | HEIGHT: 62 IN

## 2024-07-07 DIAGNOSIS — E87.6 HYPOKALEMIA: ICD-10-CM

## 2024-07-07 DIAGNOSIS — R31.9 URINARY TRACT INFECTION WITH HEMATURIA, SITE UNSPECIFIED: Primary | ICD-10-CM

## 2024-07-07 DIAGNOSIS — R10.9 FLANK PAIN: ICD-10-CM

## 2024-07-07 DIAGNOSIS — N39.0 URINARY TRACT INFECTION WITH HEMATURIA, SITE UNSPECIFIED: Primary | ICD-10-CM

## 2024-07-07 LAB
ALBUMIN SERPL BCP-MCNC: 4.6 G/DL (ref 3.5–5.2)
ALP SERPL-CCNC: 86 U/L (ref 55–135)
ALT SERPL W/O P-5'-P-CCNC: 41 U/L (ref 10–44)
ANION GAP SERPL CALC-SCNC: 7 MMOL/L (ref 8–16)
AST SERPL-CCNC: 22 U/L (ref 10–40)
B-HCG UR QL: NEGATIVE
BACTERIA #/AREA URNS HPF: ABNORMAL /HPF
BASOPHILS # BLD AUTO: 0.05 K/UL (ref 0–0.2)
BASOPHILS NFR BLD: 0.6 % (ref 0–1.9)
BILIRUB SERPL-MCNC: 0.6 MG/DL (ref 0.1–1)
BILIRUB UR QL STRIP: NEGATIVE
BUN SERPL-MCNC: 13 MG/DL (ref 6–20)
CALCIUM SERPL-MCNC: 9.3 MG/DL (ref 8.7–10.5)
CHLORIDE SERPL-SCNC: 105 MMOL/L (ref 95–110)
CLARITY UR: CLEAR
CO2 SERPL-SCNC: 27 MMOL/L (ref 23–29)
COLOR UR: COLORLESS
CREAT SERPL-MCNC: 0.8 MG/DL (ref 0.5–1.4)
CTP QC/QA: YES
DIFFERENTIAL METHOD BLD: ABNORMAL
EOSINOPHIL # BLD AUTO: 0.2 K/UL (ref 0–0.5)
EOSINOPHIL NFR BLD: 2.1 % (ref 0–8)
ERYTHROCYTE [DISTWIDTH] IN BLOOD BY AUTOMATED COUNT: 13 % (ref 11.5–14.5)
EST. GFR  (NO RACE VARIABLE): >60 ML/MIN/1.73 M^2
GLUCOSE SERPL-MCNC: 96 MG/DL (ref 70–110)
GLUCOSE UR QL STRIP: NEGATIVE
HCT VFR BLD AUTO: 42.6 % (ref 37–48.5)
HGB BLD-MCNC: 14.9 G/DL (ref 12–16)
HGB UR QL STRIP: ABNORMAL
IMM GRANULOCYTES # BLD AUTO: 0.03 K/UL (ref 0–0.04)
IMM GRANULOCYTES NFR BLD AUTO: 0.4 % (ref 0–0.5)
KETONES UR QL STRIP: NEGATIVE
LEUKOCYTE ESTERASE UR QL STRIP: ABNORMAL
LYMPHOCYTES # BLD AUTO: 1.7 K/UL (ref 1–4.8)
LYMPHOCYTES NFR BLD: 21.4 % (ref 18–48)
MCH RBC QN AUTO: 31.4 PG (ref 27–31)
MCHC RBC AUTO-ENTMCNC: 35 G/DL (ref 32–36)
MCV RBC AUTO: 90 FL (ref 82–98)
MICROSCOPIC COMMENT: ABNORMAL
MONOCYTES # BLD AUTO: 0.7 K/UL (ref 0.3–1)
MONOCYTES NFR BLD: 8 % (ref 4–15)
NEUTROPHILS # BLD AUTO: 5.5 K/UL (ref 1.8–7.7)
NEUTROPHILS NFR BLD: 67.5 % (ref 38–73)
NITRITE UR QL STRIP: NEGATIVE
NRBC BLD-RTO: 0 /100 WBC
PH UR STRIP: 6 [PH] (ref 5–8)
PLATELET # BLD AUTO: 242 K/UL (ref 150–450)
PMV BLD AUTO: 9.5 FL (ref 9.2–12.9)
POTASSIUM SERPL-SCNC: 3.4 MMOL/L (ref 3.5–5.1)
PROT SERPL-MCNC: 7.3 G/DL (ref 6–8.4)
PROT UR QL STRIP: NEGATIVE
RBC # BLD AUTO: 4.75 M/UL (ref 4–5.4)
RBC #/AREA URNS HPF: 5 /HPF (ref 0–4)
SODIUM SERPL-SCNC: 139 MMOL/L (ref 136–145)
SP GR UR STRIP: 1 (ref 1–1.03)
SQUAMOUS #/AREA URNS HPF: 2 /HPF
URN SPEC COLLECT METH UR: ABNORMAL
UROBILINOGEN UR STRIP-ACNC: NEGATIVE EU/DL
WBC # BLD AUTO: 8.08 K/UL (ref 3.9–12.7)
WBC #/AREA URNS HPF: 15 /HPF (ref 0–5)

## 2024-07-07 PROCEDURE — 81025 URINE PREGNANCY TEST: CPT | Performed by: NURSE PRACTITIONER

## 2024-07-07 PROCEDURE — 96375 TX/PRO/DX INJ NEW DRUG ADDON: CPT

## 2024-07-07 PROCEDURE — 85025 COMPLETE CBC W/AUTO DIFF WBC: CPT | Performed by: NURSE PRACTITIONER

## 2024-07-07 PROCEDURE — 63600175 PHARM REV CODE 636 W HCPCS: Performed by: STUDENT IN AN ORGANIZED HEALTH CARE EDUCATION/TRAINING PROGRAM

## 2024-07-07 PROCEDURE — 87086 URINE CULTURE/COLONY COUNT: CPT | Performed by: NURSE PRACTITIONER

## 2024-07-07 PROCEDURE — 96365 THER/PROPH/DIAG IV INF INIT: CPT

## 2024-07-07 PROCEDURE — 99285 EMERGENCY DEPT VISIT HI MDM: CPT | Mod: 25

## 2024-07-07 PROCEDURE — 25000003 PHARM REV CODE 250: Performed by: STUDENT IN AN ORGANIZED HEALTH CARE EDUCATION/TRAINING PROGRAM

## 2024-07-07 PROCEDURE — 87186 SC STD MICRODIL/AGAR DIL: CPT | Performed by: NURSE PRACTITIONER

## 2024-07-07 PROCEDURE — 81001 URINALYSIS AUTO W/SCOPE: CPT | Performed by: NURSE PRACTITIONER

## 2024-07-07 PROCEDURE — 80053 COMPREHEN METABOLIC PANEL: CPT | Performed by: NURSE PRACTITIONER

## 2024-07-07 RX ORDER — CYCLOBENZAPRINE HCL 10 MG
10 TABLET ORAL
Status: COMPLETED | OUTPATIENT
Start: 2024-07-07 | End: 2024-07-07

## 2024-07-07 RX ORDER — KETOROLAC TROMETHAMINE 10 MG/1
10 TABLET, FILM COATED ORAL EVERY 6 HOURS
Qty: 12 TABLET | Refills: 0 | Status: SHIPPED | OUTPATIENT
Start: 2024-07-07 | End: 2024-07-10

## 2024-07-07 RX ORDER — LIDOCAINE 50 MG/G
1 PATCH TOPICAL
Status: DISCONTINUED | OUTPATIENT
Start: 2024-07-07 | End: 2024-07-07 | Stop reason: HOSPADM

## 2024-07-07 RX ORDER — CYCLOBENZAPRINE HCL 10 MG
10 TABLET ORAL 3 TIMES DAILY PRN
Qty: 12 TABLET | Refills: 0 | Status: SHIPPED | OUTPATIENT
Start: 2024-07-07

## 2024-07-07 RX ORDER — LIDOCAINE 50 MG/G
1 PATCH TOPICAL DAILY
Qty: 14 PATCH | Refills: 0 | Status: SHIPPED | OUTPATIENT
Start: 2024-07-07 | End: 2024-07-21

## 2024-07-07 RX ORDER — KETOROLAC TROMETHAMINE 30 MG/ML
15 INJECTION, SOLUTION INTRAMUSCULAR; INTRAVENOUS
Status: COMPLETED | OUTPATIENT
Start: 2024-07-07 | End: 2024-07-07

## 2024-07-07 RX ORDER — CEFDINIR 300 MG/1
300 CAPSULE ORAL 2 TIMES DAILY
Qty: 14 CAPSULE | Refills: 0 | Status: SHIPPED | OUTPATIENT
Start: 2024-07-07 | End: 2024-07-14

## 2024-07-07 RX ADMIN — CYCLOBENZAPRINE 10 MG: 10 TABLET, FILM COATED ORAL at 03:07

## 2024-07-07 RX ADMIN — LIDOCAINE 1 PATCH: 50 PATCH CUTANEOUS at 03:07

## 2024-07-07 RX ADMIN — KETOROLAC TROMETHAMINE 15 MG: 30 INJECTION, SOLUTION INTRAMUSCULAR; INTRAVENOUS at 01:07

## 2024-07-07 RX ADMIN — POTASSIUM BICARBONATE 40 MEQ: 782 TABLET, EFFERVESCENT ORAL at 03:07

## 2024-07-07 RX ADMIN — CEFTRIAXONE SODIUM 1 G: 1 INJECTION, POWDER, FOR SOLUTION INTRAMUSCULAR; INTRAVENOUS at 02:07

## 2024-07-07 NOTE — DISCHARGE INSTRUCTIONS
Follow up primary care physician within 1 week for repeat evaluation and return to ED for any worsening symptoms.

## 2024-07-07 NOTE — ED PROVIDER NOTES
Encounter Date: 7/7/2024       History     Chief Complaint   Patient presents with    Flank Pain     LEFT    Urinary Frequency     49-year-old female presents with left flank pain, associated dysuria.  Also associated urinary frequency.  No trauma, fever or chills, collateral history obtained from .  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     Review of patient's allergies indicates:   Allergen Reactions    Azithromycin     Ciprofloxacin     Latex, natural rubber      Past Medical History:   Diagnosis Date    Hypertension 2019    Stroke 10/07/2021     Past Surgical History:   Procedure Laterality Date    CHOLECYSTECTOMY      TUBAL LIGATION       Family History   Problem Relation Name Age of Onset    Atrial fibrillation Mother      Hypertension Mother      Arthritis Mother      Hypertension Father      Cancer Sister          colon    Cancer Maternal Grandfather          colon    Cancer Paternal Grandfather          colon    No Known Problems Brother      No Known Problems Son      Atrial fibrillation Maternal Grandmother      No Known Problems Brother      No Known Problems Son      No Known Problems Son       Social History     Tobacco Use    Smoking status: Former     Types: Cigarettes     Passive exposure: Past    Smokeless tobacco: Never   Substance Use Topics    Alcohol use: Yes     Alcohol/week: 0.0 - 4.0 standard drinks of alcohol     Comment: socially    Drug use: Never     Review of Systems   All other systems reviewed and are negative.      Physical Exam     Initial Vitals [07/07/24 1254]   BP Pulse Resp Temp SpO2   (!) 176/106 86 18 98.8 °F (37.1 °C) 98 %      MAP       --         Physical Exam    Nursing note and vitals reviewed.  Constitutional: She is not diaphoretic.   HENT:   Head: Normocephalic.   Eyes: No scleral icterus.   Cardiovascular:  Normal rate.           Pulmonary/Chest: Effort normal. No stridor. No  respiratory distress.   Abdominal: There is no guarding.   Genitourinary:    Genitourinary Comments: No CVA tenderness left lower flank tenderness to palpation, no ecchymosis       Neurological: She is alert.   Skin: No rash noted. No erythema.         ED Course   Procedures  Labs Reviewed   CBC W/ AUTO DIFFERENTIAL - Abnormal; Notable for the following components:       Result Value    MCH 31.4 (*)     All other components within normal limits   COMPREHENSIVE METABOLIC PANEL - Abnormal; Notable for the following components:    Potassium 3.4 (*)     Anion Gap 7 (*)     All other components within normal limits   URINALYSIS, REFLEX TO URINE CULTURE - Abnormal; Notable for the following components:    Color, UA Colorless (*)     Occult Blood UA 2+ (*)     Leukocytes, UA 1+ (*)     All other components within normal limits    Narrative:     Specimen Source->Urine   URINALYSIS MICROSCOPIC - Abnormal; Notable for the following components:    RBC, UA 5 (*)     WBC, UA 15 (*)     All other components within normal limits    Narrative:     Specimen Source->Urine   CULTURE, URINE   POCT URINE PREGNANCY          Imaging Results              CT Renal Stone Study ABD Pelvis WO (Final result)  Result time 07/07/24 14:23:10      Final result by Pro Ferguson Jr., MD (07/07/24 14:23:10)                   Impression:      No evidence of obstructive genitourinary pathology.  IUD is in optimal position.      Electronically signed by: Pro Ferguson MD  Date:    07/07/2024  Time:    14:23               Narrative:    EXAMINATION:  CT RENAL STONE STUDY ABD PELVIS WO    CLINICAL HISTORY:  Flank pain, kidney stone suspected;    TECHNIQUE:  Low dose axial images, sagittal and coronal reformations were obtained from the lung bases to the pubic symphysis.  Contrast was not administered.    COMPARISON:  No previous comparison study is made available.    FINDINGS:  Visualized lung bases are clear bilaterally.  No significant pleural or  pericardial effusion.  Lower heart appears within normal limits.  No significant pericardial thickening.    Liver appears normal in size and uniform density given the lack of contrast enhancement.  No evidence of distention of the intrahepatic biliary radicles.  The gallbladder has been surgically resected.  The spleen appears normal in size and overall contour.  The stomach is decompressed although otherwise normal in CT appearance.  No adrenal enlargement.    Both kidneys are equal and symmetric without evidence of hydronephrosis.  No cortical stone formation.  The ureters appear normal in course caliber and contour with normal insertion upon the base of the urinary bladder.  Urinary bladder is filled to capacity.  There is a intrauterine device within the endometrial canal within the atelectasis of the IUD satisfactorily deployed within the apex of the uterine canal.                                       Medications   cyclobenzaprine tablet 10 mg (has no administration in time range)   LIDOcaine 5 % patch 1 patch (has no administration in time range)   potassium bicarbonate disintegrating tablet 40 mEq (has no administration in time range)   ketorolac injection 15 mg (15 mg Intravenous Given 7/7/24 1326)   cefTRIAXone (ROCEPHIN) 1 g in dextrose 5 % 100 mL IVPB (ready to mix) (0 g Intravenous Stopped 7/7/24 1500)     Medical Decision Making  49-year-old female presents with left flank pain, left lower flank, atraumatic, worse with movement.  Some associated dysuria urinary frequency, within differential diagnosis includes obstructive uropathy.  CT imaging with no acute obstructive uropathy or surgical emergency in the potassium 3.4 will be repleted orally, symptoms control with IV oral and topical medication.  Do not think patient needs any admission at this time, significant other at bedside will drive her home.  Urinalysis some evidence of infection patient received IV Rocephin and will be discharged with oral  antibiotics.  Patient and  voiced understanding and agrees with plan.    Amount and/or Complexity of Data Reviewed  Labs:  Decision-making details documented in ED Course.  Radiology: ordered.    Risk  Prescription drug management.               ED Course as of 07/07/24 1524   Sun Jul 07, 2024   1348 hCG Qualitative, Urine: Negative [KB]   1348 WBC: 8.08 [KB]   1348 Hemoglobin: 14.9 [KB]   1348 Hematocrit: 42.6 [KB]   1348 Sodium: 139 [KB]   1348 Potassium(!): 3.4 [KB]   1348 Chloride: 105 [KB]   1348 CO2: 27 [KB]   1348 Glucose: 96 [KB]   1348 BUN: 13 [KB]   1348 Creatinine: 0.8 [KB]   1348 RBC, UA(!): 5 [KB]   1348 WBC, UA(!): 15 [KB]   1348 Squam Epithel, UA: 2 [KB]   1348 Blood, UA(!): 2+ [KB]   1348 Leukocyte Esterase, UA(!): 1+ [KB]      ED Course User Index  [KB] Melo Tong Jr.,                            Clinical Impression:  Final diagnoses:  [N39.0, R31.9] Urinary tract infection with hematuria, site unspecified (Primary)  [R10.9] Flank pain  [E87.6] Hypokalemia          ED Disposition Condition    Discharge Stable          ED Prescriptions       Medication Sig Dispense Start Date End Date Auth. Provider    LIDOcaine (LIDODERM) 5 % Place 1 patch onto the skin once daily. Remove & Discard patch within 12 hours or as directed by MD for 14 days 14 patch 7/7/2024 7/21/2024 Melo Tong Jr., DO    cyclobenzaprine (FLEXERIL) 10 MG tablet Take 1 tablet (10 mg total) by mouth 3 (three) times daily as needed for Muscle spasms. 12 tablet 7/7/2024 -- Melo Tong Jr., DO    ketorolac (TORADOL) 10 mg tablet Take 1 tablet (10 mg total) by mouth every 6 (six) hours. for 12 doses 12 tablet 7/7/2024 7/10/2024 Melo Tong Jr., DO    cefdinir (OMNICEF) 300 MG capsule Take 1 capsule (300 mg total) by mouth 2 (two) times daily. for 7 days 14 capsule 7/7/2024 7/14/2024 Melo Tong Jr., DO          Follow-up Information    None          Melo Tong Jr.,   07/07/24 152

## 2024-07-09 LAB — BACTERIA UR CULT: ABNORMAL

## 2024-10-30 ENCOUNTER — OFFICE VISIT (OUTPATIENT)
Dept: OBSTETRICS AND GYNECOLOGY | Facility: CLINIC | Age: 49
End: 2024-10-30
Payer: COMMERCIAL

## 2024-10-30 VITALS
BODY MASS INDEX: 38.83 KG/M2 | WEIGHT: 211 LBS | SYSTOLIC BLOOD PRESSURE: 108 MMHG | DIASTOLIC BLOOD PRESSURE: 72 MMHG | HEIGHT: 62 IN

## 2024-10-30 DIAGNOSIS — Z01.419 ENCOUNTER FOR WELL WOMAN EXAM WITH ROUTINE GYNECOLOGICAL EXAM: Primary | ICD-10-CM

## 2024-10-30 DIAGNOSIS — Z30.9 ENCOUNTER FOR CONTRACEPTIVE MANAGEMENT, UNSPECIFIED TYPE: ICD-10-CM

## 2024-10-30 PROCEDURE — 99999 PR PBB SHADOW E&M-EST. PATIENT-LVL III: CPT | Mod: PBBFAC,,, | Performed by: OBSTETRICS & GYNECOLOGY

## 2024-11-07 ENCOUNTER — PATIENT MESSAGE (OUTPATIENT)
Dept: OBSTETRICS AND GYNECOLOGY | Facility: CLINIC | Age: 49
End: 2024-11-07
Payer: COMMERCIAL

## 2024-11-21 DIAGNOSIS — I10 ESSENTIAL HYPERTENSION: ICD-10-CM

## 2024-11-22 RX ORDER — AMLODIPINE BESYLATE 2.5 MG/1
2.5 TABLET ORAL
Qty: 90 TABLET | Refills: 0 | Status: SHIPPED | OUTPATIENT
Start: 2024-11-22

## 2024-11-22 RX ORDER — HYDROCHLOROTHIAZIDE 25 MG/1
25 TABLET ORAL
Qty: 90 TABLET | Refills: 0 | Status: SHIPPED | OUTPATIENT
Start: 2024-11-22

## 2024-11-26 ENCOUNTER — OFFICE VISIT (OUTPATIENT)
Dept: OBSTETRICS AND GYNECOLOGY | Facility: CLINIC | Age: 49
End: 2024-11-26
Payer: COMMERCIAL

## 2024-11-26 VITALS
RESPIRATION RATE: 18 BRPM | DIASTOLIC BLOOD PRESSURE: 74 MMHG | BODY MASS INDEX: 38.59 KG/M2 | HEIGHT: 62 IN | SYSTOLIC BLOOD PRESSURE: 122 MMHG

## 2024-11-26 DIAGNOSIS — Z30.432 ENCOUNTER FOR IUD REMOVAL: Primary | ICD-10-CM

## 2024-11-26 DIAGNOSIS — Z30.430 ENCOUNTER FOR INSERTION OF MIRENA IUD: ICD-10-CM

## 2024-11-26 PROCEDURE — 88300 SURGICAL PATH GROSS: CPT | Performed by: PATHOLOGY

## 2024-11-26 PROCEDURE — 99999 PR PBB SHADOW E&M-EST. PATIENT-LVL III: CPT | Mod: PBBFAC,,, | Performed by: OBSTETRICS & GYNECOLOGY

## 2024-11-26 NOTE — PROGRESS NOTES
History of Present Illness:   Pateint presents today  For IUD removal,no complaitns.    Pathology: none    Physical exam:  /78   Wt 82 kg (180 lb 12.4 oz)   BMI 36.51 kg/m²   Constitutional: She is oriented to person, place, and time. She appears well-developed and well-nourished. No distress.   HENT:   Head: Normocephalic and atraumatic.   Eyes: Conjunctivae and EOM are normal. No scleral icterus.   Neck: Normal range of motion. Neck supple. No tracheal deviation present.   Cardiovascular: Normal rate.    Pulmonary/Chest: Effort normal. No respiratory distress. She exhibits no tenderness.  Breasts: deferred  Abdominal: Soft. She exhibits no distension and no mass. There is no rebound and no guarding.   Genitourinary:     External rectal exam shows no thrombosed external hemorrhoids.    Pelvic exam was performed with patient supine.   There is no rash, lesion or injury on the right labia.   There is no rash, lesion or injury on the left labia.   No bleeding and no signs of injury around the vaginal introitus, urethra is without lesions and well supported. The cervix is visualized with no discharge, lesions or friability. Intrauterine Device string easily visualized, IUD removed with ring forceps without difficulty, tolerated well.    No vaginal discharge found.    No significant Cystocele, Enterocele or rectocele, and uterus well supported.   Bimanual exam: deferred  Neurological: She is alert and oriented to person, place, and time. Coordination normal.   Skin: Skin is warm and dry. She is not diaphoretic.   Psychiatric: She has a normal mood and affect.        Intrauterine device Placement:  11/26/2024    PRE-IUD PLACEMENT COUNSELING:  All contraceptive options were reviewed and the patient chooses an IUD.  The patient's history was reviewed and there are no contraindications to an IUD. The procedure and minimal risks of pain, bleeding, perforation and infection at the insertion and spontaneous expulsion  within the first two weeks was discussed. The benefits of amenorrhea and no systemic side effects were explained. All questions were answered and the patient agrees to proceed. Consent was signed (scanned into computer).    EXAM:  Uterine Position: mid    PROCEDURE:  TIME OUT PERFORMED.  The cervix visualized with a speculum.  A single tooth tenaculum was not placed on the anterior lip.  The uterus sounded to 8cm using sterile technique.  A Mirena IUD (lot#[id number] )  was loaded and placed high in uterine fundus without difficulty using sterile technique.  The string was cut to 2cm length from exo cervix.   All instruments were removed from the cervix and vagina and the procedure was tolerated well.     ASSESSMENT:  1. Contraception management / IUD insertion.V25.0.    POST IUD PLACEMENT COUNSELING:  Manage post IUD placement pain with NSAIDs, Tylenol or Rx per [employer].  IUD danger signs and how to check the strings.  Removal in 5 years for Mirena IUD and in 10 years for Copper IUD.    Counseling lasted approximately 15 minutes and all her questions were answered.    FOLLOW-UP: With me in four weeks.

## 2024-11-29 LAB
FINAL PATHOLOGIC DIAGNOSIS: NORMAL
GROSS: NORMAL
Lab: NORMAL

## 2024-12-24 ENCOUNTER — OFFICE VISIT (OUTPATIENT)
Dept: OBSTETRICS AND GYNECOLOGY | Facility: CLINIC | Age: 49
End: 2024-12-24
Payer: COMMERCIAL

## 2024-12-24 VITALS — WEIGHT: 210.56 LBS | BODY MASS INDEX: 38.51 KG/M2

## 2024-12-24 DIAGNOSIS — Z30.431 ENCOUNTER FOR ROUTINE CHECKING OF INTRAUTERINE CONTRACEPTIVE DEVICE (IUD): Primary | ICD-10-CM

## 2024-12-24 PROCEDURE — 1159F MED LIST DOCD IN RCRD: CPT | Mod: CPTII,S$GLB,, | Performed by: OBSTETRICS & GYNECOLOGY

## 2024-12-24 PROCEDURE — 99999 PR PBB SHADOW E&M-EST. PATIENT-LVL III: CPT | Mod: PBBFAC,,, | Performed by: OBSTETRICS & GYNECOLOGY

## 2024-12-24 PROCEDURE — 3044F HG A1C LEVEL LT 7.0%: CPT | Mod: CPTII,S$GLB,, | Performed by: OBSTETRICS & GYNECOLOGY

## 2024-12-24 PROCEDURE — 3008F BODY MASS INDEX DOCD: CPT | Mod: CPTII,S$GLB,, | Performed by: OBSTETRICS & GYNECOLOGY

## 2024-12-24 PROCEDURE — 99213 OFFICE O/P EST LOW 20 MIN: CPT | Mod: S$GLB,,, | Performed by: OBSTETRICS & GYNECOLOGY

## 2024-12-24 NOTE — PROGRESS NOTES
History of Present Illness:   Pateint presents today 1 month status post Intrauterine Device  Insertion without complaint. .    Pathology: none    Physical exam:  BP (P) 132/72 (Patient Position: Sitting)   Wt 95.5 kg (210 lb 8.6 oz)   BMI 38.51 kg/m²   Constitutional: She is oriented to person, place, and time. She appears well-developed and well-nourished. No distress.   HENT:   Head: Normocephalic and atraumatic.   Eyes: Conjunctivae and EOM are normal. No scleral icterus.   Neck: Normal range of motion. Neck supple. No tracheal deviation present.   Cardiovascular: Normal rate.    Pulmonary/Chest: Effort normal. No respiratory distress. She exhibits no tenderness.  Breasts: deferred  Abdominal: Soft. She exhibits no distension and no mass. There is no rebound and no guarding.   Genitourinary:     External rectal exam shows no thrombosed external hemorrhoids.    Pelvic exam was performed with patient supine.   There is no rash, lesion or injury on the right labia.   There is no rash, lesion or injury on the left labia.   No bleeding and no signs of injury around the vaginal introitus, urethra is without lesions and well supported. The cervix is visualized with no discharge, lesions or friability. Intrauterine Device string easily visualized.    No vaginal discharge found.    No significant Cystocele, Enterocele or rectocele, and uterus well supported.   Bimanual exam:   The urethra is normal to palpation and there are no palpable vaginal wall masses.   Uterus is not deviated, not enlarged, not fixed, normal shape and not tender.   Cervix exhibits no motion tenderness.    Right adnexum displays no mass and no tenderness.   Left adnexum displays no mass and no tenderness.  Neurological: She is alert and oriented to person, place, and time. Coordination normal.   Skin: Skin is warm and dry. She is not diaphoretic.   Psychiatric: She has a normal mood and affect.    Assessment:  Doing well with IUD.  Mirena placed  11/26/24 - lot #[id number]    Plan:  Resume normal activity and follow up as scheduled for routine screening.

## 2025-02-03 ENCOUNTER — TELEPHONE (OUTPATIENT)
Dept: FAMILY MEDICINE | Facility: CLINIC | Age: 50
End: 2025-02-03
Payer: COMMERCIAL

## 2025-02-03 NOTE — TELEPHONE ENCOUNTER
----- Message from Afsaneh sent at 2/3/2025 11:56 AM CST -----  Pt is returning the office call. Pt #[phone]

## 2025-02-03 NOTE — TELEPHONE ENCOUNTER
I called the patient in regards to her appointment that is scheduled on 02/05/2025. We are looking into reason for visit, per Carlos Alla. No answer left voicemail to return our call. Sending patient portal message

## 2025-02-05 ENCOUNTER — LAB VISIT (OUTPATIENT)
Dept: LAB | Facility: HOSPITAL | Age: 50
End: 2025-02-05
Attending: NURSE PRACTITIONER
Payer: COMMERCIAL

## 2025-02-05 ENCOUNTER — OFFICE VISIT (OUTPATIENT)
Dept: FAMILY MEDICINE | Facility: CLINIC | Age: 50
End: 2025-02-05
Payer: COMMERCIAL

## 2025-02-05 VITALS
SYSTOLIC BLOOD PRESSURE: 126 MMHG | OXYGEN SATURATION: 99 % | BODY MASS INDEX: 38.59 KG/M2 | DIASTOLIC BLOOD PRESSURE: 72 MMHG | HEIGHT: 62 IN | WEIGHT: 209.69 LBS | HEART RATE: 72 BPM

## 2025-02-05 DIAGNOSIS — Z00.00 ROUTINE HEALTH MAINTENANCE: ICD-10-CM

## 2025-02-05 DIAGNOSIS — Z00.00 ROUTINE HEALTH MAINTENANCE: Primary | ICD-10-CM

## 2025-02-05 LAB
ALBUMIN SERPL BCP-MCNC: 4.4 G/DL (ref 3.5–5.2)
ALP SERPL-CCNC: 80 U/L (ref 40–150)
ALT SERPL W/O P-5'-P-CCNC: 27 U/L (ref 10–44)
ANION GAP SERPL CALC-SCNC: 12 MMOL/L (ref 8–16)
AST SERPL-CCNC: 22 U/L (ref 10–40)
BACTERIA #/AREA URNS AUTO: ABNORMAL /HPF
BASOPHILS # BLD AUTO: 0.05 K/UL (ref 0–0.2)
BASOPHILS NFR BLD: 1 % (ref 0–1.9)
BILIRUB SERPL-MCNC: 0.8 MG/DL (ref 0.1–1)
BILIRUB UR QL STRIP: NEGATIVE
BUN SERPL-MCNC: 11 MG/DL (ref 6–20)
CALCIUM SERPL-MCNC: 9.5 MG/DL (ref 8.7–10.5)
CHLORIDE SERPL-SCNC: 106 MMOL/L (ref 95–110)
CHOLEST SERPL-MCNC: 151 MG/DL (ref 120–199)
CHOLEST/HDLC SERPL: 3.7 {RATIO} (ref 2–5)
CLARITY UR REFRACT.AUTO: ABNORMAL
CO2 SERPL-SCNC: 22 MMOL/L (ref 23–29)
COLOR UR AUTO: YELLOW
CREAT SERPL-MCNC: 0.8 MG/DL (ref 0.5–1.4)
DIFFERENTIAL METHOD BLD: ABNORMAL
EOSINOPHIL # BLD AUTO: 0.2 K/UL (ref 0–0.5)
EOSINOPHIL NFR BLD: 2.9 % (ref 0–8)
ERYTHROCYTE [DISTWIDTH] IN BLOOD BY AUTOMATED COUNT: 13.4 % (ref 11.5–14.5)
EST. GFR  (NO RACE VARIABLE): >60 ML/MIN/1.73 M^2
ESTIMATED AVG GLUCOSE: 94 MG/DL (ref 68–131)
GLUCOSE SERPL-MCNC: 93 MG/DL (ref 70–110)
GLUCOSE UR QL STRIP: NEGATIVE
HBA1C MFR BLD: 4.9 % (ref 4–5.6)
HCT VFR BLD AUTO: 44.3 % (ref 37–48.5)
HDLC SERPL-MCNC: 41 MG/DL (ref 40–75)
HDLC SERPL: 27.2 % (ref 20–50)
HGB BLD-MCNC: 14.9 G/DL (ref 12–16)
HGB UR QL STRIP: ABNORMAL
IMM GRANULOCYTES # BLD AUTO: 0.01 K/UL (ref 0–0.04)
IMM GRANULOCYTES NFR BLD AUTO: 0.2 % (ref 0–0.5)
KETONES UR QL STRIP: NEGATIVE
LDLC SERPL CALC-MCNC: 98.8 MG/DL (ref 63–159)
LEUKOCYTE ESTERASE UR QL STRIP: ABNORMAL
LYMPHOCYTES # BLD AUTO: 1.7 K/UL (ref 1–4.8)
LYMPHOCYTES NFR BLD: 32.2 % (ref 18–48)
MCH RBC QN AUTO: 31.6 PG (ref 27–31)
MCHC RBC AUTO-ENTMCNC: 33.6 G/DL (ref 32–36)
MCV RBC AUTO: 94 FL (ref 82–98)
MICROSCOPIC COMMENT: ABNORMAL
MONOCYTES # BLD AUTO: 0.4 K/UL (ref 0.3–1)
MONOCYTES NFR BLD: 8.2 % (ref 4–15)
NEUTROPHILS # BLD AUTO: 2.9 K/UL (ref 1.8–7.7)
NEUTROPHILS NFR BLD: 55.5 % (ref 38–73)
NITRITE UR QL STRIP: NEGATIVE
NONHDLC SERPL-MCNC: 110 MG/DL
NRBC BLD-RTO: 0 /100 WBC
PH UR STRIP: 7 [PH] (ref 5–8)
PLATELET # BLD AUTO: 297 K/UL (ref 150–450)
PMV BLD AUTO: 10.5 FL (ref 9.2–12.9)
POTASSIUM SERPL-SCNC: 3.8 MMOL/L (ref 3.5–5.1)
PROT SERPL-MCNC: 7.6 G/DL (ref 6–8.4)
PROT UR QL STRIP: NEGATIVE
RBC # BLD AUTO: 4.72 M/UL (ref 4–5.4)
RBC #/AREA URNS AUTO: 3 /HPF (ref 0–4)
SODIUM SERPL-SCNC: 140 MMOL/L (ref 136–145)
SP GR UR STRIP: 1.01 (ref 1–1.03)
SQUAMOUS #/AREA URNS AUTO: 6 /HPF
TRIGL SERPL-MCNC: 56 MG/DL (ref 30–150)
URN SPEC COLLECT METH UR: ABNORMAL
WBC # BLD AUTO: 5.25 K/UL (ref 3.9–12.7)
WBC #/AREA URNS AUTO: 18 /HPF (ref 0–5)

## 2025-02-05 PROCEDURE — 85025 COMPLETE CBC W/AUTO DIFF WBC: CPT | Performed by: NURSE PRACTITIONER

## 2025-02-05 PROCEDURE — 3074F SYST BP LT 130 MM HG: CPT | Mod: CPTII,S$GLB,, | Performed by: NURSE PRACTITIONER

## 2025-02-05 PROCEDURE — 99999 PR PBB SHADOW E&M-EST. PATIENT-LVL III: CPT | Mod: PBBFAC,,, | Performed by: NURSE PRACTITIONER

## 2025-02-05 PROCEDURE — 3078F DIAST BP <80 MM HG: CPT | Mod: CPTII,S$GLB,, | Performed by: NURSE PRACTITIONER

## 2025-02-05 PROCEDURE — 80053 COMPREHEN METABOLIC PANEL: CPT | Performed by: NURSE PRACTITIONER

## 2025-02-05 PROCEDURE — 83036 HEMOGLOBIN GLYCOSYLATED A1C: CPT | Performed by: NURSE PRACTITIONER

## 2025-02-05 PROCEDURE — 3008F BODY MASS INDEX DOCD: CPT | Mod: CPTII,S$GLB,, | Performed by: NURSE PRACTITIONER

## 2025-02-05 PROCEDURE — 1159F MED LIST DOCD IN RCRD: CPT | Mod: CPTII,S$GLB,, | Performed by: NURSE PRACTITIONER

## 2025-02-05 PROCEDURE — 80061 LIPID PANEL: CPT | Performed by: NURSE PRACTITIONER

## 2025-02-05 PROCEDURE — 99396 PREV VISIT EST AGE 40-64: CPT | Mod: S$GLB,,, | Performed by: NURSE PRACTITIONER

## 2025-02-05 PROCEDURE — 81001 URINALYSIS AUTO W/SCOPE: CPT | Performed by: NURSE PRACTITIONER

## 2025-02-05 NOTE — PROGRESS NOTES
Patient ID: Sirisha Loco is a 49 y.o. female.    Chief Complaint: Annual Exam (Annual/Well Exam)    Ms. Cueto presents today for annual physical. She states she is doing well overall. She denies any new issues or complaints at the time of this visit. We reviewed all overdue health maintenance.       Past Medical History:   Diagnosis Date    Hypertension 2019    Stroke 10/07/2021     Past Surgical History:   Procedure Laterality Date    CHOLECYSTECTOMY      TUBAL LIGATION           Tobacco History:  reports that she has quit smoking. Her smoking use included cigarettes. She has been exposed to tobacco smoke. She has never used smokeless tobacco.      Review of patient's allergies indicates:   Allergen Reactions    Azithromycin     Ciprofloxacin     Latex, natural rubber        Current Outpatient Medications:     amLODIPine (NORVASC) 2.5 MG tablet, Take 1 tablet by mouth once daily, Disp: 90 tablet, Rfl: 0    cyclobenzaprine (FLEXERIL) 10 MG tablet, Take 1 tablet (10 mg total) by mouth 3 (three) times daily as needed for Muscle spasms., Disp: 12 tablet, Rfl: 0    hydroCHLOROthiazide (HYDRODIURIL) 25 MG tablet, Take 1 tablet by mouth once daily, Disp: 90 tablet, Rfl: 0    Review of Systems   Constitutional:  Positive for activity change. Negative for unexpected weight change.   HENT:  Negative for hearing loss, rhinorrhea and trouble swallowing.    Eyes:  Negative for discharge and visual disturbance.   Respiratory:  Negative for chest tightness and wheezing.    Cardiovascular:  Negative for chest pain and palpitations.   Gastrointestinal:  Negative for blood in stool, constipation, diarrhea and vomiting.   Endocrine: Negative for polydipsia and polyuria.   Genitourinary:  Negative for difficulty urinating, dysuria, hematuria and menstrual problem.   Musculoskeletal:  Negative for arthralgias, joint swelling and neck pain.   Neurological:  Negative for weakness and headaches.   Psychiatric/Behavioral:   "Negative for confusion and dysphoric mood.           Objective:      Vitals:    02/05/25 0831   BP: 126/72   Pulse: 72   SpO2: 99%   Weight: 95.1 kg (209 lb 11.2 oz)   Height: 5' 2" (1.575 m)     Physical Exam  Constitutional:       Appearance: Normal appearance. She is obese.   HENT:      Nose: Nose normal.      Mouth/Throat:      Mouth: Mucous membranes are moist.   Cardiovascular:      Rate and Rhythm: Normal rate.      Pulses: Normal pulses.      Heart sounds: Normal heart sounds.   Pulmonary:      Effort: Pulmonary effort is normal.      Breath sounds: Normal breath sounds.   Abdominal:      Palpations: Abdomen is soft.   Skin:     General: Skin is warm.   Neurological:      Mental Status: She is alert and oriented to person, place, and time. Mental status is at baseline.   Psychiatric:         Mood and Affect: Mood normal.         Behavior: Behavior normal.         Thought Content: Thought content normal.         Judgment: Judgment normal.           Assessment:       1. Routine health maintenance           Plan:       Routine health maintenance  -     CBC W/ AUTO DIFFERENTIAL; Future; Expected date: 02/05/2025  -     COMPREHENSIVE METABOLIC PANEL; Future; Expected date: 02/05/2025  -     HEMOGLOBIN A1C; Future; Expected date: 02/05/2025  -     LIPID PANEL; Future; Expected date: 02/05/2025  -     Urinalysis; Future; Expected date: 02/05/2025      Follow up in about 1 year (around 2/5/2026), or if symptoms worsen or fail to improve, for Annual .        2/5/2025 Tanisha Gold NP    "

## 2025-02-23 DIAGNOSIS — I10 ESSENTIAL HYPERTENSION: ICD-10-CM

## 2025-02-24 RX ORDER — AMLODIPINE BESYLATE 2.5 MG/1
2.5 TABLET ORAL
Qty: 90 TABLET | Refills: 0 | Status: SHIPPED | OUTPATIENT
Start: 2025-02-24

## 2025-02-24 RX ORDER — HYDROCHLOROTHIAZIDE 25 MG/1
25 TABLET ORAL
Qty: 90 TABLET | Refills: 0 | Status: SHIPPED | OUTPATIENT
Start: 2025-02-24

## 2025-02-24 NOTE — TELEPHONE ENCOUNTER
[facility] 02/05/2025  [place] NO FUTURE APPT  LF  AMLODIPINE 11/22/2024 90 DAYS  HYDROCHLOROTHIAZIDE 11/22/2024 90 DAYS

## 2025-05-09 ENCOUNTER — PATIENT OUTREACH (OUTPATIENT)
Dept: ADMINISTRATIVE | Facility: HOSPITAL | Age: 50
End: 2025-05-09
Payer: COMMERCIAL

## 2025-05-09 NOTE — PROGRESS NOTES
Population Health Chart Review & Patient Outreach Details      Additional [facility] Notes:               Updates Requested / Reviewed:      Updated Care Coordination Note, Care Everywhere, , and Immunizations Reconciliation Completed or Queried: [place]         Health Maintenance Topics Overdue:      [facility] Score: 0     Patient is not due for any topics at this time.                       Health Maintenance Topic(s) Outreach Outcomes & Actions Taken:    Medication Adherence / Statins - Outreach Outcomes & Actions Taken  : Sent Provider Message to Review to Evaluate Pt for Statin, Add Exclusion Dx Codes, Document Exclusion in Problem List, Change Statin Intensity Level to Moderate or High Intensity if Applicable

## 2025-05-26 DIAGNOSIS — I10 ESSENTIAL HYPERTENSION: ICD-10-CM

## 2025-05-26 RX ORDER — AMLODIPINE BESYLATE 2.5 MG/1
2.5 TABLET ORAL
Qty: 90 TABLET | Refills: 0 | Status: SHIPPED | OUTPATIENT
Start: 2025-05-26

## 2025-05-26 RX ORDER — HYDROCHLOROTHIAZIDE 25 MG/1
25 TABLET ORAL
Qty: 90 TABLET | Refills: 0 | Status: SHIPPED | OUTPATIENT
Start: 2025-05-26

## 2025-06-19 DIAGNOSIS — Z12.31 OTHER SCREENING MAMMOGRAM: ICD-10-CM

## 2025-08-22 DIAGNOSIS — I10 ESSENTIAL HYPERTENSION: ICD-10-CM

## 2025-08-22 RX ORDER — HYDROCHLOROTHIAZIDE 25 MG/1
25 TABLET ORAL
Qty: 90 TABLET | Refills: 0 | Status: SHIPPED | OUTPATIENT
Start: 2025-08-22

## 2025-08-22 RX ORDER — AMLODIPINE BESYLATE 2.5 MG/1
2.5 TABLET ORAL
Qty: 90 TABLET | Refills: 0 | Status: SHIPPED | OUTPATIENT
Start: 2025-08-22